# Patient Record
Sex: MALE | Race: OTHER | Employment: OTHER | URBAN - METROPOLITAN AREA
[De-identification: names, ages, dates, MRNs, and addresses within clinical notes are randomized per-mention and may not be internally consistent; named-entity substitution may affect disease eponyms.]

---

## 2020-12-01 ENCOUNTER — APPOINTMENT (OUTPATIENT)
Dept: CT IMAGING | Age: 20
DRG: 871 | End: 2020-12-01
Attending: EMERGENCY MEDICINE

## 2020-12-01 ENCOUNTER — HOSPITAL ENCOUNTER (INPATIENT)
Age: 20
LOS: 4 days | Discharge: HOME | DRG: 871 | End: 2020-12-07
Attending: PEDIATRICS | Admitting: FAMILY MEDICINE

## 2020-12-01 DIAGNOSIS — Z20.822 SUSPECTED COVID-19 VIRUS INFECTION: Primary | ICD-10-CM

## 2020-12-01 DIAGNOSIS — R06.02 SHORTNESS OF BREATH: ICD-10-CM

## 2020-12-01 DIAGNOSIS — R50.9 FEVER, UNSPECIFIED FEVER CAUSE: ICD-10-CM

## 2020-12-01 DIAGNOSIS — J12.9 VIRAL PNEUMONITIS: ICD-10-CM

## 2020-12-01 DIAGNOSIS — R05.9 COUGH: ICD-10-CM

## 2020-12-01 LAB
ANION GAP SERPL CALC-SCNC: 10 MMOL/L (ref 5–15)
BASOPHILS # BLD: 0 K/UL (ref 0–0.1)
BASOPHILS NFR BLD: 0 % (ref 0–1)
BUN SERPL-MCNC: 13 MG/DL (ref 6–20)
BUN/CREAT SERPL: 14 (ref 12–20)
CALCIUM SERPL-MCNC: 8.6 MG/DL (ref 8.5–10.1)
CHLORIDE SERPL-SCNC: 99 MMOL/L (ref 97–108)
CO2 SERPL-SCNC: 22 MMOL/L (ref 21–32)
COMMENT, HOLDF: NORMAL
CREAT SERPL-MCNC: 0.92 MG/DL (ref 0.7–1.3)
DIFFERENTIAL METHOD BLD: ABNORMAL
EOSINOPHIL # BLD: 0 K/UL (ref 0–0.4)
EOSINOPHIL NFR BLD: 0 % (ref 0–7)
ERYTHROCYTE [DISTWIDTH] IN BLOOD BY AUTOMATED COUNT: 12.2 % (ref 11.5–14.5)
GLUCOSE SERPL-MCNC: 103 MG/DL (ref 65–100)
HCT VFR BLD AUTO: 42.5 % (ref 36.6–50.3)
HGB BLD-MCNC: 14.6 G/DL (ref 12.1–17)
IMM GRANULOCYTES # BLD AUTO: 0 K/UL (ref 0–0.04)
IMM GRANULOCYTES NFR BLD AUTO: 0 % (ref 0–0.5)
LACTATE SERPL-SCNC: 1.1 MMOL/L (ref 0.4–2)
LYMPHOCYTES # BLD: 1 K/UL (ref 0.8–3.5)
LYMPHOCYTES NFR BLD: 8 % (ref 12–49)
MCH RBC QN AUTO: 27.9 PG (ref 26–34)
MCHC RBC AUTO-ENTMCNC: 34.4 G/DL (ref 30–36.5)
MCV RBC AUTO: 81.1 FL (ref 80–99)
MONOCYTES # BLD: 1 K/UL (ref 0–1)
MONOCYTES NFR BLD: 9 % (ref 5–13)
NEUTS SEG # BLD: 9.3 K/UL (ref 1.8–8)
NEUTS SEG NFR BLD: 83 % (ref 32–75)
NRBC # BLD: 0 K/UL (ref 0–0.01)
NRBC BLD-RTO: 0 PER 100 WBC
PLATELET # BLD AUTO: 269 K/UL (ref 150–400)
PMV BLD AUTO: 10 FL (ref 8.9–12.9)
POTASSIUM SERPL-SCNC: 3.9 MMOL/L (ref 3.5–5.1)
RBC # BLD AUTO: 5.24 M/UL (ref 4.1–5.7)
SAMPLES BEING HELD,HOLD: NORMAL
SODIUM SERPL-SCNC: 131 MMOL/L (ref 136–145)
WBC # BLD AUTO: 11.4 K/UL (ref 4.1–11.1)

## 2020-12-01 PROCEDURE — 87635 SARS-COV-2 COVID-19 AMP PRB: CPT

## 2020-12-01 PROCEDURE — 82550 ASSAY OF CK (CPK): CPT

## 2020-12-01 PROCEDURE — 85025 COMPLETE CBC W/AUTO DIFF WBC: CPT

## 2020-12-01 PROCEDURE — 82728 ASSAY OF FERRITIN: CPT

## 2020-12-01 PROCEDURE — 71275 CT ANGIOGRAPHY CHEST: CPT

## 2020-12-01 PROCEDURE — 36415 COLL VENOUS BLD VENIPUNCTURE: CPT

## 2020-12-01 PROCEDURE — 80048 BASIC METABOLIC PNL TOTAL CA: CPT

## 2020-12-01 PROCEDURE — 74011250636 HC RX REV CODE- 250/636: Performed by: PEDIATRICS

## 2020-12-01 PROCEDURE — 86140 C-REACTIVE PROTEIN: CPT

## 2020-12-01 PROCEDURE — 83605 ASSAY OF LACTIC ACID: CPT

## 2020-12-01 PROCEDURE — 74011250637 HC RX REV CODE- 250/637: Performed by: EMERGENCY MEDICINE

## 2020-12-01 PROCEDURE — 99285 EMERGENCY DEPT VISIT HI MDM: CPT

## 2020-12-01 PROCEDURE — 74011000636 HC RX REV CODE- 636: Performed by: RADIOLOGY

## 2020-12-01 PROCEDURE — 74011000258 HC RX REV CODE- 258: Performed by: RADIOLOGY

## 2020-12-01 PROCEDURE — 83615 LACTATE (LD) (LDH) ENZYME: CPT

## 2020-12-01 PROCEDURE — 96361 HYDRATE IV INFUSION ADD-ON: CPT

## 2020-12-01 PROCEDURE — 74011250636 HC RX REV CODE- 250/636: Performed by: EMERGENCY MEDICINE

## 2020-12-01 PROCEDURE — 96374 THER/PROPH/DIAG INJ IV PUSH: CPT

## 2020-12-01 PROCEDURE — 84145 PROCALCITONIN (PCT): CPT

## 2020-12-01 RX ORDER — ACETAMINOPHEN 500 MG
1000 TABLET ORAL
Status: COMPLETED | OUTPATIENT
Start: 2020-12-01 | End: 2020-12-01

## 2020-12-01 RX ORDER — SODIUM CHLORIDE 0.9 % (FLUSH) 0.9 %
10 SYRINGE (ML) INJECTION
Status: COMPLETED | OUTPATIENT
Start: 2020-12-01 | End: 2020-12-01

## 2020-12-01 RX ORDER — SODIUM CHLORIDE 9 MG/ML
70 INJECTION, SOLUTION INTRAVENOUS
Status: COMPLETED | OUTPATIENT
Start: 2020-12-02 | End: 2020-12-01

## 2020-12-01 RX ORDER — IBUPROFEN 400 MG/1
800 TABLET ORAL
Status: COMPLETED | OUTPATIENT
Start: 2020-12-01 | End: 2020-12-01

## 2020-12-01 RX ORDER — DEXAMETHASONE SODIUM PHOSPHATE 10 MG/ML
10 INJECTION INTRAMUSCULAR; INTRAVENOUS ONCE
Status: COMPLETED | OUTPATIENT
Start: 2020-12-02 | End: 2020-12-01

## 2020-12-01 RX ADMIN — Medication 10 ML: at 22:00

## 2020-12-01 RX ADMIN — SODIUM CHLORIDE 100 ML: 900 INJECTION, SOLUTION INTRAVENOUS at 22:00

## 2020-12-01 RX ADMIN — ACETAMINOPHEN 1000 MG: 500 TABLET ORAL at 20:43

## 2020-12-01 RX ADMIN — DEXAMETHASONE SODIUM PHOSPHATE 10 MG: 10 INJECTION, SOLUTION INTRAMUSCULAR; INTRAVENOUS at 23:40

## 2020-12-01 RX ADMIN — IBUPROFEN 800 MG: 400 TABLET, FILM COATED ORAL at 20:43

## 2020-12-01 RX ADMIN — SODIUM CHLORIDE 1000 ML: 900 INJECTION, SOLUTION INTRAVENOUS at 20:56

## 2020-12-01 RX ADMIN — SODIUM CHLORIDE 70 ML/HR: 900 INJECTION, SOLUTION INTRAVENOUS at 23:40

## 2020-12-01 RX ADMIN — IOPAMIDOL 80 ML: 755 INJECTION, SOLUTION INTRAVENOUS at 21:00

## 2020-12-02 PROBLEM — J12.82 PNEUMONIA DUE TO COVID-19 VIRUS: Status: ACTIVE | Noted: 2020-12-01

## 2020-12-02 PROBLEM — U07.1 PNEUMONIA DUE TO COVID-19 VIRUS: Status: ACTIVE | Noted: 2020-12-01

## 2020-12-02 PROBLEM — J12.9 VIRAL PNEUMONIA: Status: ACTIVE | Noted: 2020-12-02

## 2020-12-02 LAB
ALBUMIN SERPL-MCNC: 3 G/DL (ref 3.5–5)
ALBUMIN/GLOB SERPL: 0.7 {RATIO} (ref 1.1–2.2)
ALP SERPL-CCNC: 74 U/L (ref 45–117)
ALT SERPL-CCNC: 42 U/L (ref 12–78)
ANION GAP SERPL CALC-SCNC: 11 MMOL/L (ref 5–15)
AST SERPL-CCNC: 29 U/L (ref 15–37)
BILIRUB DIRECT SERPL-MCNC: 0.3 MG/DL (ref 0–0.2)
BILIRUB SERPL-MCNC: 1.2 MG/DL (ref 0.2–1)
BUN SERPL-MCNC: 14 MG/DL (ref 6–20)
BUN/CREAT SERPL: 14 (ref 12–20)
CALCIUM SERPL-MCNC: 9 MG/DL (ref 8.5–10.1)
CHLORIDE SERPL-SCNC: 106 MMOL/L (ref 97–108)
CK SERPL-CCNC: 145 U/L (ref 39–308)
CO2 SERPL-SCNC: 21 MMOL/L (ref 21–32)
COVID-19 RAPID TEST, COVR: DETECTED
CREAT SERPL-MCNC: 1.03 MG/DL (ref 0.7–1.3)
CRP SERPL-MCNC: 10.7 MG/DL (ref 0–0.6)
D DIMER PPP FEU-MCNC: 0.72 MG/L FEU (ref 0–0.65)
FERRITIN SERPL-MCNC: 630 NG/ML (ref 26–388)
GLOBULIN SER CALC-MCNC: 4.4 G/DL (ref 2–4)
GLUCOSE SERPL-MCNC: 86 MG/DL (ref 65–100)
LDH SERPL L TO P-CCNC: 331 U/L (ref 85–241)
POTASSIUM SERPL-SCNC: 4.2 MMOL/L (ref 3.5–5.1)
PROCALCITONIN SERPL-MCNC: 0.12 NG/ML
PROT SERPL-MCNC: 7.4 G/DL (ref 6.4–8.2)
SODIUM SERPL-SCNC: 138 MMOL/L (ref 136–145)
SOURCE, COVRS: ABNORMAL
SPECIMEN SOURCE, FCOV2M: ABNORMAL
SPECIMEN TYPE, XMCV1T: ABNORMAL
TROPONIN I SERPL-MCNC: <0.05 NG/ML

## 2020-12-02 PROCEDURE — 99218 HC RM OBSERVATION: CPT

## 2020-12-02 PROCEDURE — 74011000258 HC RX REV CODE- 258: Performed by: INTERNAL MEDICINE

## 2020-12-02 PROCEDURE — 74011250636 HC RX REV CODE- 250/636: Performed by: HOSPITALIST

## 2020-12-02 PROCEDURE — 80048 BASIC METABOLIC PNL TOTAL CA: CPT

## 2020-12-02 PROCEDURE — XW033E5 INTRODUCTION OF REMDESIVIR ANTI-INFECTIVE INTO PERIPHERAL VEIN, PERCUTANEOUS APPROACH, NEW TECHNOLOGY GROUP 5: ICD-10-PCS | Performed by: INTERNAL MEDICINE

## 2020-12-02 PROCEDURE — 80076 HEPATIC FUNCTION PANEL: CPT

## 2020-12-02 PROCEDURE — 74011000250 HC RX REV CODE- 250: Performed by: INTERNAL MEDICINE

## 2020-12-02 PROCEDURE — 96372 THER/PROPH/DIAG INJ SC/IM: CPT

## 2020-12-02 PROCEDURE — 96375 TX/PRO/DX INJ NEW DRUG ADDON: CPT

## 2020-12-02 PROCEDURE — 74011250637 HC RX REV CODE- 250/637: Performed by: FAMILY MEDICINE

## 2020-12-02 PROCEDURE — 84484 ASSAY OF TROPONIN QUANT: CPT

## 2020-12-02 PROCEDURE — 85379 FIBRIN DEGRADATION QUANT: CPT

## 2020-12-02 PROCEDURE — 36415 COLL VENOUS BLD VENIPUNCTURE: CPT

## 2020-12-02 RX ORDER — ZINC SULFATE 50(220)MG
1 CAPSULE ORAL DAILY
Status: DISCONTINUED | OUTPATIENT
Start: 2020-12-02 | End: 2020-12-07 | Stop reason: HOSPADM

## 2020-12-02 RX ORDER — SODIUM CHLORIDE 0.9 % (FLUSH) 0.9 %
5-40 SYRINGE (ML) INJECTION AS NEEDED
Status: DISCONTINUED | OUTPATIENT
Start: 2020-12-02 | End: 2020-12-07 | Stop reason: HOSPADM

## 2020-12-02 RX ORDER — ENOXAPARIN SODIUM 100 MG/ML
40 INJECTION SUBCUTANEOUS DAILY
Status: DISCONTINUED | OUTPATIENT
Start: 2020-12-02 | End: 2020-12-02

## 2020-12-02 RX ORDER — ACETAMINOPHEN 650 MG/1
650 SUPPOSITORY RECTAL
Status: DISCONTINUED | OUTPATIENT
Start: 2020-12-02 | End: 2020-12-07 | Stop reason: HOSPADM

## 2020-12-02 RX ORDER — SODIUM CHLORIDE 0.9 % (FLUSH) 0.9 %
5-40 SYRINGE (ML) INJECTION EVERY 8 HOURS
Status: DISCONTINUED | OUTPATIENT
Start: 2020-12-02 | End: 2020-12-07 | Stop reason: HOSPADM

## 2020-12-02 RX ORDER — ASCORBIC ACID 500 MG
500 TABLET ORAL DAILY
Status: DISCONTINUED | OUTPATIENT
Start: 2020-12-02 | End: 2020-12-07 | Stop reason: HOSPADM

## 2020-12-02 RX ORDER — ENOXAPARIN SODIUM 100 MG/ML
40 INJECTION SUBCUTANEOUS EVERY 12 HOURS
Status: DISCONTINUED | OUTPATIENT
Start: 2020-12-02 | End: 2020-12-07 | Stop reason: HOSPADM

## 2020-12-02 RX ORDER — DEXAMETHASONE 4 MG/1
6 TABLET ORAL DAILY
Status: DISCONTINUED | OUTPATIENT
Start: 2020-12-02 | End: 2020-12-07 | Stop reason: HOSPADM

## 2020-12-02 RX ORDER — ACETAMINOPHEN 325 MG/1
650 TABLET ORAL
Status: DISCONTINUED | OUTPATIENT
Start: 2020-12-02 | End: 2020-12-07 | Stop reason: HOSPADM

## 2020-12-02 RX ADMIN — ZINC SULFATE 220 MG (50 MG) CAPSULE 1 CAPSULE: CAPSULE at 08:49

## 2020-12-02 RX ADMIN — ACETAMINOPHEN 650 MG: 325 TABLET ORAL at 16:47

## 2020-12-02 RX ADMIN — REMDESIVIR 200 MG: 100 INJECTION, POWDER, LYOPHILIZED, FOR SOLUTION INTRAVENOUS at 19:54

## 2020-12-02 RX ADMIN — ENOXAPARIN SODIUM 40 MG: 40 INJECTION SUBCUTANEOUS at 09:58

## 2020-12-02 RX ADMIN — Medication 10 ML: at 06:25

## 2020-12-02 RX ADMIN — Medication 10 ML: at 21:17

## 2020-12-02 RX ADMIN — OXYCODONE HYDROCHLORIDE AND ACETAMINOPHEN 500 MG: 500 TABLET ORAL at 08:49

## 2020-12-02 RX ADMIN — ENOXAPARIN SODIUM 40 MG: 40 INJECTION SUBCUTANEOUS at 21:17

## 2020-12-02 RX ADMIN — DEXAMETHASONE 6 MG: 4 TABLET ORAL at 08:49

## 2020-12-02 NOTE — H&P
[facility] [facility]  History and Physical    Primary Care Provider: None  Date of Service:  12/1/2020    Subjective:     Qiana Strange is a previously healthy [address] [facility] y.o. male who presents with dyspnea, dry cough, subjective fever, fatigue, and myalgia for 1 week, and is being admitted for viral pneumonia. He took Tylenol, and aspirin for his symptoms with no improvement. He denies chest pain. He endorses nausea, and decreased appetite. Patient works disinfecting factories. He lives with his sister, and another friend, and denies sick contacts, or recent travel. In the ED, he was febrile to 103.1, tachycardic to 128, and tachypneic to 39. Spo2 was 93-95% on room air. He had a leukocytosis to 11.4 with left shift, hyponatremia to 131, and normal lactate of 1.1. CTA showed bilateral pulmonary infiltrates, and no evidence of PE. He received tylenol, motrin, dexamethosone, and 1L ns bolus    Review of Systems:    A comprehensive review of systems was negative except for that written in the History of Present Illness. History reviewed. No pertinent past medical history. Past Surgical History:   Procedure Laterality Date    HX APPENDECTOMY      HX HEENT      adnoidectomy    HX ORTHOPAEDIC      ankle-ligaments     Prior to Admission medications    Not on File     Allergies   Allergen Reactions    Seafood Anaphylaxis      History reviewed. No pertinent family history. SOCIAL HISTORY:  Patient resides at Home. Patient ambulates without assistance  Smoking history: never  Alcohol history: none        Objective:       Physical Exam:   Visit Vitals  BP (!) 116/93 (BP 1 Location: Right arm, BP Patient Position: At rest)   Pulse 98   Temp 98.3 °F (36.8 °C)   Resp 24   Wt 100.7 kg (222 lb 0.1 oz)   SpO2 99%     General:  Alert, cooperative, no distress, appears stated age. Head:  Normocephalic, without obvious abnormality, atraumatic.    Eyes:  Conjunctivae/corneas clear.  EOMs intact. Throat: Lips, mucosa, and tongue normal. Teeth and gums normal.   Neck: Supple, symmetrical, trachea midline       Lungs:   Clear to auscultation bilaterally. spo2 95% RA, RR 12 at rest   Chest wall:  No tenderness or deformity. Heart:  Regular rate and rhythm, S1, S2 normal, no murmur, click, rub or gallop. Abdomen:   Soft, non-tender. Bowel sounds normal. No masses,  No organomegaly. Extremities: Extremities normal, atraumatic, no cyanosis or edema. Pulses: 2+ radial pulses   Skin: Skin color, texture, turgor normal. No rashes or lesions       Recent Results (from the past 24 hour(s))   SAMPLES BEING HELD    Collection Time: 12/01/20  8:57 PM   Result Value Ref Range    SAMPLES BEING HELD 1 RED, 1 JARRED, 2 BC(LAV), 2 BC(blue)     COMMENT        Add-on orders for these samples will be processed based on acceptable specimen integrity and analyte stability, which may vary by analyte. CBC WITH AUTOMATED DIFF    Collection Time: 12/01/20  8:57 PM   Result Value Ref Range    WBC 11.4 (H) 4.1 - 11.1 K/uL    RBC 5.24 4.10 - 5.70 M/uL    HGB 14.6 12.1 - 17.0 g/dL    HCT 42.5 36.6 - 50.3 %    MCV 81.1 80.0 - 99.0 FL    MCH 27.9 26.0 - 34.0 PG    MCHC 34.4 30.0 - 36.5 g/dL    RDW 12.2 11.5 - 14.5 %    PLATELET 070 882 - 788 K/uL    MPV 10.0 8.9 - 12.9 FL    NRBC 0.0 0  WBC    ABSOLUTE NRBC 0.00 0.00 - 0.01 K/uL    NEUTROPHILS 83 (H) 32 - 75 %    LYMPHOCYTES 8 (L) 12 - 49 %    MONOCYTES 9 5 - 13 %    EOSINOPHILS 0 0 - 7 %    BASOPHILS 0 0 - 1 %    IMMATURE GRANULOCYTES 0 0.0 - 0.5 %    ABS. NEUTROPHILS 9.3 (H) 1.8 - 8.0 K/UL    ABS. LYMPHOCYTES 1.0 0.8 - 3.5 K/UL    ABS. MONOCYTES 1.0 0.0 - 1.0 K/UL    ABS. EOSINOPHILS 0.0 0.0 - 0.4 K/UL    ABS. BASOPHILS 0.0 0.0 - 0.1 K/UL    ABS. IMM.  GRANS. 0.0 0.00 - 0.04 K/UL    DF AUTOMATED     METABOLIC PANEL, BASIC    Collection Time: 12/01/20  8:57 PM   Result Value Ref Range    Sodium 131 (L) 136 - 145 mmol/L    Potassium 3.9 3.5 - 5.1 mmol/L    Chloride 99 97 - 108 mmol/L    CO2 22 21 - 32 mmol/L    Anion gap 10 5 - 15 mmol/L    Glucose 103 (H) 65 - 100 mg/dL    BUN 13 6 - 20 MG/DL    Creatinine 0.92 0.70 - 1.30 MG/DL    BUN/Creatinine ratio 14 12 - 20      GFR est AA >60 >60 ml/min/1.73m2    GFR est non-AA >60 >60 ml/min/1.73m2    Calcium 8.6 8.5 - 10.1 MG/DL   LACTIC ACID    Collection Time: 12/01/20  8:57 PM   Result Value Ref Range    Lactic acid 1.1 0.4 - 2.0 MMOL/L   SARS-COV-2    Collection Time: 12/01/20 11:45 PM   Result Value Ref Range    Specimen source Nasopharyngeal      Specimen source Nasopharyngeal      COVID-19 rapid test Detected (AA) NOTD      Specimen type NP Swab       Cta Chest W Or W Wo Cont    Result Date: 12/1/2020  IMPRESSION: Fairly significant bilateral pulmonary infiltrates. Consider atypical viral pneumonia. No evidence of pulmonary embolism. Data Review: All diagnostic labs and studies have been reviewed.     Assessment:     Active Problems:    Suspected COVID-19 virus infection (12/1/2020)        Plan:     #COVID pneumonia  -Rapid Covid positive  -supplemental O2 PRN, currently 93-95% on RA  -CK, CRP, d-dimer, ferritin, LDH, procalcitonin, troponin  -zinc + vitamin C  -lovenox for dvt ppx    #Hyponatremia  -Na 131  -IVF  -daily BMP    dvt ppx: lovenox  Code: full  MPOA: Marta Hidalgo (sister)>[phone]      FUNCTIONAL STATUS PRIOR TO HOSPITALIZATION Ambulates Independently     Signed By: Hayley Boston MD     December 1, 2020

## 2020-12-02 NOTE — ED NOTES
As patient is requiring oxygen for care of his COVID-19 pneumonitis current recommendations are to treat with dexamethasone as it reduces mortality. We are treating with 10 mg of IV dexamethasone.

## 2020-12-02 NOTE — ED NOTES
TRANSFER - OUT REPORT:    Verbal report given to Srinivas Hackett on [employer]  being transferred to 205 for routine progression of care       Report consisted of patients Situation, Background, Assessment and   Recommendations(SBAR). Information from the following report(s) SBAR, ED Summary, Intake/Output, MAR and Recent Results was reviewed with the receiving nurse. Lines:   Peripheral IV 12/01/20 Left Antecubital (Active)   Site Assessment Clean, dry, & intact 12/01/20 2055   Phlebitis Assessment 0 12/01/20 2055   Infiltration Assessment 0 12/01/20 2055   Dressing Status Clean, dry, & intact 12/01/20 2055       Peripheral IV 12/01/20 Right Hand (Active)   Site Assessment Clean, dry, & intact 12/01/20 2110   Phlebitis Assessment 0 12/01/20 2110   Infiltration Assessment 0 12/01/20 2110   Dressing Status Clean, dry, & intact 12/01/20 2110        Opportunity for questions and clarification was provided.       Patient transported with:   Powered

## 2020-12-02 NOTE — ED NOTES
Reassessment complete: Coarse breath sounds noted throughout. Respirations even and unlabored. Pt. Remains on 2 L of oxygen via nasal cannula. Pt. Denies SOB at this time. Pt. Denies any c/o pain. Updated patient on plan of care, admission to 205. Pt. Verbalized understanding.

## 2020-12-02 NOTE — PROGRESS NOTES
[address] [facility]  [facility]ospitalist [facility]                                                                                          Hospitalist Progress Note  Marina Prado MD  Answering service: [phone] from in house phone        Date of Service:  2020  NAME:  Carlos Alberto Mayberry  :  2000  MRN:  [id number]      Admission Summary:   23 y.o M admitted due to COVID 19 pneumonia    Interval history / Subjective:   Patient seen and examined     Spoke to patient with help of     States that he has dyspnea on exertion, cough. Assessment & Plan:     # COVID 19 pneumonia  - O2 sat 91-92% on room air  -continue decadron,vitamin C and zinc.  -ID consulted for remdesivir  -discussed with patient about remdesivir.  -monitor oxygen requirements  -trend inflammatory markers  Can consider convalescent plasma based on clinical course    #Mild hyponatremia:  -recheck sodium. Code status: full  DVT prophylaxis: lovenox    Care Plan discussed with:patient,nurse  Anticipated Disposition: home  Anticipated  [facility] Problems  Never Reviewed          Codes Class Noted POA    Viral pneumonia ICD-10-CM: J12.9  ICD-9-CM: 480.9  2020 Unknown        Pneumonia due to COVID-19 virus ICD-10-CM: U07.1, J12.89  ICD-9-CM: 480.8  2020 Yes                Review of Systems:   As per HPI      Vital Signs:    Last 24hrs VS reviewed since prior progress note.  Most recent are:  Visit Vitals  BP (!) 143/87 (BP 1 Location: Right arm, BP Patient Position: At rest)   Pulse (!) 106   Temp 99.3 °F (37.4 °C)   Resp 10   Ht 6' (1.829 m)   Wt 100.7 kg (222 lb)   SpO2 92%   BMI 30.11 kg/m²         Intake/Output Summary (Last 24 hours) at 2020 1755  Last data filed at 2020 9904  Gross per 24 hour   Intake 120 ml   Output --   Net 120 ml        Physical Examination:     I had a face to face encounter with this patient and independently examined them on 2020 as outlined below:          Constitutional:  No acute distress       Resp:  no wheezing,no accessory muscle use,no conversational tachypnea   CV:  Regular rhythm, normal rate    GI:  Soft, non distended, non tender     Musculoskeletal:  No LE edema    Neurologic:  Moves all extremities. AAOx3     Psych:  Not anxious nor agitated. Skin:   no rashes or ulcers       Data Review:    Review and/or order of clinical lab test  Review and/or order of tests in the radiology section of CPT  Review and/or order of tests in the medicine section of CPT      Labs:     Recent Labs     12/01/20 2057   WBC 11.4*   HGB 14.6   HCT 42.5        Recent Labs     12/01/20 2057   *   K 3.9   CL 99   CO2 22   BUN 13   CREA 0.92   *   CA 8.6     Recent Labs     12/02/20 0206   ALT 42   AP 74   TBILI 1.2*   TP 7.4   ALB 3.0*   GLOB 4.4*     No results for input(s): INR, PTP, APTT, INREXT in the last 72 hours. Recent Labs     12/01/20 2057   FERR 630*      No results found for: FOL, RBCF   No results for input(s): PH, PCO2, PO2 in the last 72 hours.   Recent Labs     12/02/20 0206 12/01/20 2057   CPK  --  145   TROIQ <0.05  --      No results found for: CHOL, CHOLX, CHLST, CHOLV, HDL, HDLP, LDL, LDLC, DLDLP, TGLX, TRIGL, TRIGP, CHHD, CHHDX  No results found for: GLUCPOC  No results found for: COLOR, APPRN, SPGRU, REFSG, ALEX, PROTU, GLUCU, KETU, BILU, UROU, ABDOUL, LEUKU, GLUKE, EPSU, BACTU, WBCU, RBCU, CASTS, UCRY      Medications Reviewed:     Current Facility-Administered Medications   Medication Dose Route Frequency    sodium chloride (NS) flush 5-40 mL  5-40 mL IntraVENous Q8H    sodium chloride (NS) flush 5-40 mL  5-40 mL IntraVENous PRN    acetaminophen (TYLENOL) tablet 650 mg  650 mg Oral Q6H PRN    Or    acetaminophen (TYLENOL) suppository 650 mg  650 mg Rectal Q6H PRN    zinc sulfate (ZINCATE) 220 (50) mg capsule 1 Cap  1 Cap Oral DAILY    ascorbic acid (vitamin C) (VITAMIN C) tablet 500 mg  500 mg Oral DAILY    dexAMETHasone (DECADRON) tablet 6 mg  6 mg Oral DAILY    enoxaparin (LOVENOX) injection 40 mg  40 mg SubCUTAneous Q12H     ______________________________________________________________________  EXPECTED LENGTH OF STAY: - - -  ACTUAL LENGTH OF STAY:          0                 Sahra Castillo MD

## 2020-12-02 NOTE — ED TRIAGE NOTES
Triage Note: Pt. Had a fever last week started on Monday x 3 days. Pt. States pt. Started with a cough on Saturday. Pt. Was seen at [facility] yesterday- COVID test done, results unknown. Pt. C/o shortenss of breath and difficulty breathing that started yesterday. Pt. States fever restarted yesterday. Pt. Drinking approx. 5-6 bottles of water per day. Pt. States pt. Voided 2-3 x's today. No Meds PTA. Triage information obtained using the I AM AT video  #[id number].

## 2020-12-02 NOTE — CONSULTS
Remdesivir Initiation Note    This patient meets criteria for initiation of remdesivir based on the following:  · Proven COVID-19  · Moderate disease (Requiring supplemental O2)  · Acceptable hepatic function (ALT within 5 times ULN)       Liver function tests will be monitored daily while on remdesivir. I discussed with Mr. Scot Crigler. His saturation on room air is less than 94%. I told him that there is no mortality benefit but it could reduce the duration of symptoms by 4 days. I discussed with him the possible side effects which include liver toxicity, headache and dizziness. He has agreed to the drug and I have already ordered it.

## 2020-12-02 NOTE — ED NOTES
Pt. Receiving IV fluids at this time. Pt. Remains on cardiac monitor and continuous pulse ox. Sats-95 %. RR-36. Will continue to monitor.

## 2020-12-02 NOTE — ED NOTES
Reassessment complete: Coarse breath sounds noted throughout. RR-24. Pt. Coughing while attempting to take a deep breath. Pt. C/o pain while taking a deep breath. Pt. Remains on continuous pulse ox and cardiac monitor. Pt. Voided 500 ml of clear yellow urine. Will continue to monitor.

## 2020-12-02 NOTE — PROGRESS NOTES
Patient titrated off of oxygen this morning, placed on O2 monitor. Patient reports dyspnea with exertion. Appetite is moderate. Problem: Risk for Spread of Infection  Goal: Prevent transmission of infectious organism to others  Description: Prevent the transmission of infectious organisms to other patients, staff members, and visitors. Outcome: Progressing Towards Goal     Problem: Patient Education:  Go to Education Activity  Goal: Patient/Family Education  Outcome: Progressing Towards Goal     Problem: Airway Clearance - Ineffective  Goal: Achieve or maintain patent airway  Outcome: Progressing Towards Goal     Problem: Gas Exchange - Impaired  Goal: Absence of hypoxia  Outcome: Progressing Towards Goal  Goal: Promote optimal lung function  Outcome: Progressing Towards Goal     Problem: Breathing Pattern - Ineffective  Goal: Ability to achieve and maintain a regular respiratory rate  Outcome: Progressing Towards Goal     Problem:  Body Temperature -  Risk of, Imbalanced  Goal: Ability to maintain a body temperature within defined limits  Outcome: Progressing Towards Goal  Goal: Will regain or maintain usual level of consciousness  Outcome: Progressing Towards Goal  Goal: Complications related to the disease process, condition or treatment will be avoided or minimized  Outcome: Progressing Towards Goal     Problem: Isolation Precautions - Risk of Spread of Infection  Goal: Prevent transmission of infectious organism to others  Outcome: Progressing Towards Goal     Problem: Nutrition Deficits  Goal: Optimize nutrtional status  Outcome: Progressing Towards Goal     Problem: Risk for Fluid Volume Deficit  Goal: Maintain normal heart rhythm  Outcome: Progressing Towards Goal  Goal: Maintain absence of muscle cramping  Outcome: Progressing Towards Goal  Goal: Maintain normal serum potassium, sodium, calcium, phosphorus, and pH  Outcome: Progressing Towards Goal     Problem: Loneliness or Risk for Loneliness  Goal: Demonstrate positive use of time alone when socialization is not possible  Outcome: Progressing Towards Goal     Problem: Fatigue  Goal: Verbalize increase energy and improved vitality  Outcome: Progressing Towards Goal     Problem: Patient Education: Go to Patient Education Activity  Goal: Patient/Family Education  Outcome: Progressing Towards Goal

## 2020-12-02 NOTE — PROGRESS NOTES
TRANSFER - IN REPORT:    Verbal report received from Figueroa Charlton RN(name) on Nanette Vent[facility] 42  being received from ED(unit) for routine progression of care      Report consisted of patients Situation, Background, Assessment and   Recommendations(SBAR). Information from the following report(s) SBAR, Kardex, ED Summary, [facility] and Recent Results was reviewed with the receiving nurse. Opportunity for questions and clarification was provided. Assessment completed upon patients arrival to unit and care assumed.

## 2020-12-02 NOTE — PROGRESS NOTES
Patient placed on continuous pulse ox monitor, oxygen removed. Patient sats 92-93% on room air while laying in bed. Patient reports dyspnea on exertion and strong dry cough.  Patient wore oxygen during the night at 2L for comfort as he felt short of breath

## 2020-12-02 NOTE — PROGRESS NOTES
Bedside shift change report given to [address] (oncoming nurse) by Baker Bumpers (offgoing nurse). Report included the following information SBAR, Kardex, MAR and Recent Results.

## 2020-12-02 NOTE — ED NOTES
10:43 PM  Change of shift. Care of patient taken over from [facility]; H&P reviewed, bedside handoff complete. Awaiting CT and reeval.    Patient is a 23-year-old otherwise healthy male who is been sick for 8 days with cough and congestion and fevers. The fevers improved and then recurred now with worsening shortness of breath over the last 2 days. On examination he has coarse breath sounds with crackles throughout, he is tachypneic from 22-28 for respiratory rate. Oxygen saturations are 94% on room air while resting. Per report oxygen saturations while walking dropped to 92. CT with multifocal viral pneumonitis. This is consistent with a symptomatic patient with Covid pneumonia on day 8 of infection. As he was tachypneic and felt short of breath I started nasal cannula oxygen at 2 L to which she says he feels a little better. 10:47 PM  On reevaluation now several minutes after starting oxygen patient states he does feel better. We will admit to the hospitalist service for further evaluation and care. Perfect Serve Consult for Admission  10:49 PM    ED Room Number: 09/09  Patient Name and age:  Clearnce Side 23 y.o.  male  Working Diagnosis:   1. Suspected COVID-19 virus infection    2. Fever, unspecified fever cause    3. Cough    4. Viral pneumonitis    5. Shortness of breath        COVID-19 Suspicion:  yes  Sepsis present:  no  Reassessment needed: no  Code Status:  Full Code  Readmission: no  Isolation Requirements:  yes  Recommended Level of Care:  med/surg  Department:[facility] ED - [phone]  Other: Probable Covid pneumonia with multifocal viral pneumonitis on CT, dyspnea, and fever. Improved with nasal cannula oxygen.

## 2020-12-02 NOTE — PROGRESS NOTES
Provided  services remotely to Doctor Vincetn Rnagel during an update to patient.               Estefany Blazing [employer]  [phone]

## 2020-12-02 NOTE — ED NOTES
Reassessment complete: Coarse breath sounds noted throughout. Respirations even and unlabored. Pt. States SOB decreased since being placed on 2 L of oxygen. Pt. Remains on cardiac monitor and continuous pulse ox. Pt. Voided 400 ml of clear yellow urine. Will continue to monitor.

## 2020-12-02 NOTE — ED PROVIDER NOTES
Patient is a 17-year-old male with no significant past medical history presenting to emergency department for evaluation of 1 week history of intermittent fever, dry cough, and shortness of breath. Cough and shortness of breath been present for the past 4 days. He reports he was tested for coronavirus yesterday [facility] and is supposed to get his results back tomorrow, would not like another test today in ED. He denies any known sick contacts. He does not have a thermometer at home and does not know how high his temperature has been. He also has had nausea and loose stools. He denies headache, dizziness, chest pain, abdominal pain, vomiting, bloody stool, urinary changes, leg swelling, decreased po intake, decreased urine output, or any other medical complaints at this time. He smokes cigarettes occasionally. Please note that this dictation was completed with [employer], the [employer] voice recognition software.  Quite often unanticipated grammatical, syntax, homophones, and other interpretive errors are inadvertently transcribed by the computer software.  Please disregard these errors.  Please excuse any errors that have escaped final proofreading. Due to language barrier, [employer]  service was used throughout history, physical exam, and subsequent discussion with this patient. History reviewed. No pertinent past medical history. Past Surgical History:   Procedure Laterality Date    HX APPENDECTOMY      HX HEENT      adnoidectomy    HX ORTHOPAEDIC      ankle-ligaments         History reviewed. No pertinent family history.     Social History     Socioeconomic History    Marital status: Not on file     Spouse name: Not on file    Number of children: Not on file    Years of education: Not on file    Highest education level: Not on file   Occupational History    Not on file   Social Needs    Financial resource strain: Not on file    Food insecurity     Worry: Not on file     Inability: Not on file    Transportation needs     Medical: Not on file     Non-medical: Not on file   Tobacco Use    Smoking status: Current Some Day Smoker    Smokeless tobacco: Current User   Substance and Sexual Activity    Alcohol use: Not Currently     Comment:      Drug use: Not on file    Sexual activity: Not on file   Lifestyle    Physical activity     Days per week: Not on file     Minutes per session: Not on file    Stress: Not on file   Relationships    Social connections     Talks on phone: Not on file     Gets together: Not on file     Attends Anabaptist service: Not on file     Active member of club or organization: Not on file     Attends meetings of clubs or organizations: Not on file     Relationship status: Not on file    Intimate partner violence     Fear of current or ex partner: Not on file     Emotionally abused: Not on file     Physically abused: Not on file     Forced sexual activity: Not on file   Other Topics Concern    Not on file   Social History Narrative    Not on file         ALLERGIES: Seafood    Review of Systems   Constitutional: Positive for fever. Negative for chills. HENT: Negative for ear pain and sore throat. Eyes: Negative for visual disturbance. Respiratory: Positive for cough and shortness of breath. Cardiovascular: Negative for chest pain and leg swelling. Gastrointestinal: Positive for nausea. Negative for abdominal pain, blood in stool, diarrhea (loose stools only) and vomiting. Genitourinary: Negative for dysuria, flank pain and hematuria. Musculoskeletal: Negative for back pain. Skin: Negative for color change and rash. Neurological: Negative for dizziness and headaches. Psychiatric/Behavioral: Negative for confusion. Vitals:    12/01/20 2026   SpO2: 94%   Weight: 100.7 kg (222 lb 0.1 oz)            Physical Exam  Vitals signs and nursing note reviewed. Constitutional:       General: He is not in acute distress. Appearance: He is ill-appearing. HENT:      Head: Normocephalic and atraumatic. Mouth/Throat:      Pharynx: Oropharynx is clear. Eyes:      Extraocular Movements: Extraocular movements intact. Conjunctiva/sclera: Conjunctivae normal.   Neck:      Musculoskeletal: Normal range of motion. No neck rigidity. Cardiovascular:      Rate and Rhythm: Regular rhythm. Tachycardia present. Heart sounds: Normal heart sounds. Pulmonary:      Effort: Pulmonary effort is normal. Tachypnea present. No accessory muscle usage, prolonged expiration or respiratory distress. Breath sounds: Normal breath sounds. No stridor. No decreased breath sounds, wheezing, rhonchi or rales. Chest:      Chest wall: No tenderness. Abdominal:      General: Abdomen is flat. There is no distension. Palpations: Abdomen is soft. Tenderness: There is no abdominal tenderness. There is no guarding. Musculoskeletal: Normal range of motion. Right lower leg: No edema. Left lower leg: No edema. Lymphadenopathy:      Cervical: No cervical adenopathy. Skin:     General: Skin is warm and dry. Findings: No rash. Neurological:      General: No focal deficit present. Mental Status: He is alert and oriented to person, place, and time. Psychiatric:         Mood and Affect: Mood normal.          MDM  Number of Diagnoses or Management Options  Cough:   Fever, unspecified fever cause: Suspected COVID-19 virus infection:   Diagnosis management comments: Patient is alert, febrile, tachycardic, tachypneic, oxygen saturation 94% on room air. BP stable. No accessory muscle usage or retractions. Patient is ambulatory in ED without signs of acute distress. One week history of upper respiratory symptoms. Tested for Covid yesterday, has not received results yet. Lungs are clear to auscultation bilaterally.  Patient ambulated with nursing staff, maintained oxygen saturation 92-94% on room air, no clinical changes. Plan: CBC, CMP, lactate, CTA of chest to evaluate for pneumonia versus PE.    9:41 PM  Change of shift. Care of patient signed over to ED attending Dr. Manpreet Patel. Labwork shows leukocytosis, lactate normal, metabolic panel unremarkable. Awaiting CTA of chest and disposition. Amount and/or Complexity of Data Reviewed  Clinical lab tests: reviewed  Tests in the radiology section of CPT®: reviewed  Tests in the medicine section of CPT®: reviewed  Discuss the patient with other providers: yes (ED attending Dr. Manpreet Patel)      ED Course as of Dec 01 2141   Tue Dec 01, 2020   2112 WBC(!): 11.4 [EH]   2139 Temp(!): 103.1 °F (39.5 °C) [EH]   2139 Pulse (Heart Rate)(!): 115 [EH]   0659 METABOLIC PANEL, BASIC(!):    Sodium 131(!)   Potassium 3.9   Chloride 99   CO2 22   Anion gap 10   Glucose 103(!)   BUN 13   Creatinine 0.92   BUN/Creatinine ratio 14   GFR est AA >60   GFR est non-AA >60   Calcium 8.6 [EH]   2141 CBC WITH AUTOMATED DIFF(!):    WBC 11.4(!)   RBC 5.24   HGB 14.6   HCT 42.5   MCV 81.1   MCH 27.9   MCHC 34.4   RDW 12.2   PLATELET 032   MPV 58.6   NRBC 0.0   ABSOLUTE NRBC 0.00   NEUTROPHILS 83(!)   LYMPHOCYTES 8(!)   MONOCYTES 9   EOSINOPHILS 0   BASOPHILS 0   IMMATURE GRANULOCYTES 0   ABS. NEUTROPHILS 9.3(!)   ABS. LYMPHOCYTES 1.0   ABS. MONOCYTES 1.0   ABS. EOSINOPHILS 0.0   ABS. BASOPHILS 0.0   ABS. IMM. Dakota Gin 0.0   DF AUTOMATED [EH]      ED Course User Index  [EH] KATHY Farr    Maksim Ray was evaluated in the Emergency Department on 12/1/2020 for the symptoms described in the history of present illness. He/she was evaluated in the context of the global COVID-19 pandemic, which necessitated consideration that the patient might be at risk for infection with the SARS-CoV-2 virus that causes COVID-19.  Institutional protocols and algorithms that pertain to the evaluation of patients at risk for COVID-19 are in a state of rapid change based on information released by regulatory bodies including the CDC and federal and state organizations. These policies and algorithms were followed during the patient's care in the ED. Surrogate Decision Maker (Who do you want to make decisions for you in the event you are not able to?): No emergency contact information on file.     Ventilation (Do you want to be intubated and mechanically ventilated?):  Yes    CPR (Do you want chest compressions and electricity in an attempt to restart your heart?): Yes

## 2020-12-02 NOTE — DISCHARGE INSTRUCTIONS
Dear Clemente Sorto,    Thank you for choosing [address] [place] [address] for your healthcare needs. We strive to provide EXCELLENT care to you and your family. In an effort to explain clearly why you were here in the hospital, I've also written a very brief summary below. Other details including formal diagnosis, medication changes, and follow up appointment recommendations can also be found in this packet. You were admitted for shortness of breath due to COVID-19 for which you were started steroids, and IV remdesivir. You also received care from specialist physicians in the following specialties:  IP CONSULT TO INFECTIOUS DISEASES    Here are the updates to your medication list:  **Continue decadron for an additional 4 days  ** Continue vitamin C, and vitamin Zn     Remember that it is important for you to take your medications exactly as they are prescribed. It is helpful to keep a list of your medication with the names, dosages, and times to be taken in your wallet. Additionally,     - Please make sure to follow up with your primary care physician within 1-2 weeks of discharge for hospital follow up. - Please make sure to continue to monitor for: worsening chest pain, worsening shortness of breath, and recurrence of high fevers and return to the Emergency Department with any of these symptoms:   - Please get up slowly from a seated or laying position, avoid falls. - Avoid tobacco, alcohol and other illicit drug use. - You should quarantine for an additional 10 days post discharge, and the remaining three days of quarantine have to be completely symptom free. -Advance Care Planning  People with COVID-19 may have no symptoms, mild symptoms, such as fever, cough, and shortness of breath or they may have more severe illness, developing severe and fatal pneumonia.   As a result, Advance Care Planning with attention to naming a health care decision maker (someone you trust to make healthcare decisions for you if you could not speak for yourself) and sharing other health care preferences is important BEFORE a possible health crisis. Please contact your Primary Care Provider to discuss Advance Care Planning. Preventing the Spread of Coronavirus Disease 2019 in Homes and Residential Communities  For the most recent information go to [employer]    Prevention steps for People with confirmed or suspected COVID-19 (including persons under investigation) who do not need to be hospitalized  and   People with confirmed COVID-19 who were hospitalized and determined to be medically stable to go home    Your healthcare provider and public health staff will evaluate whether you can be cared for at home. If it is determined that you do not need to be hospitalized and can be isolated at home, you will be monitored by staff from your local or state health department. You should follow the prevention steps below until a healthcare provider or local or state health department says you can return to your normal activities. Stay home except to get medical care  People who are mildly ill with COVID-19 are able to isolate at home during their illness. You should restrict activities outside your home, except for getting medical care. Do not go to work, school, or public areas. Avoid using public transportation, ride-sharing, or taxis. Separate yourself from other people and animals in your home  People: As much as possible, you should stay in a specific room and away from other people in your home. Also, you should use a separate bathroom, if available. Animals: You should restrict contact with pets and other animals while you are sick with COVID-19, just like you would around other people.  Although there have not been reports of pets or other animals becoming sick with COVID-19, it is still recommended that people sick with COVID-19 limit contact with animals until more information is known about the virus. When possible, have another member of your household care for your animals while you are sick. If you are sick with COVID-19, avoid contact with your pet, including petting, snuggling, being kissed or licked, and sharing food. If you must care for your pet or be around animals while you are sick, wash your hands before and after you interact with pets and wear a facemask. Call ahead before visiting your doctor  If you have a medical appointment, call the healthcare provider and tell them that you have or may have COVID-19. This will help the healthcare providers office take steps to keep other people from getting infected or exposed. Wear a facemask  You should wear a facemask when you are around other people (e.g., sharing a room or vehicle) or pets and before you enter a healthcare providers office. If you are not able to wear a facemask (for example, because it causes trouble breathing), then people who live with you should not stay in the same room with you, or they should wear a facemask if they enter your room. Cover your coughs and sneezes  Cover your mouth and nose with a tissue when you cough or sneeze. Throw used tissues in a lined trash can. Immediately wash your hands with soap and water for at least 20 seconds or, if soap and water are not available, clean your hands with an alcohol-based hand  that contains at least 60% alcohol. Clean your hands often  Wash your hands often with soap and water for at least 20 seconds, especially after blowing your nose, coughing, or sneezing; going to the bathroom; and before eating or preparing food. If soap and water are not readily available, use an alcohol-based hand  with at least 60% alcohol, covering all surfaces of your hands and rubbing them together until they feel dry. Soap and water are the best option if hands are visibly dirty.  Avoid touching your eyes, nose, and mouth with unwashed hands. Avoid sharing personal household items  You should not share dishes, drinking glasses, cups, eating utensils, towels, or bedding with other people or pets in your home. After using these items, they should be washed thoroughly with soap and water. Clean all high-touch surfaces everyday  High touch surfaces include counters, tabletops, doorknobs, bathroom fixtures, toilets, phones, keyboards, tablets, and bedside tables. Also, clean any surfaces that may have blood, stool, or body fluids on them. Use a household cleaning spray or wipe, according to the label instructions. Labels contain instructions for safe and effective use of the cleaning product including precautions you should take when applying the product, such as wearing gloves and making sure you have good ventilation during use of the product. Monitor your symptoms  Seek prompt medical attention if your illness is worsening (e.g., difficulty breathing). Before seeking care, call your healthcare provider and tell them that you have, or are being evaluated for, COVID-19. Put on a facemask before you enter the facility. These steps will help the healthcare providers office to keep other people in the office or waiting room from getting infected or exposed. Ask your healthcare provider to call the local or state health department. Persons who are placed under active monitoring or facilitated self-monitoring should follow instructions provided by their local health department or occupational health professionals, as appropriate. When working with your local health department check their available hours. If you have a medical emergency and need to call 911, notify the dispatch personnel that you have, or are being evaluated for COVID-19. If possible, put on a facemask before emergency medical services arrive.   Discontinuing home isolation  Patients with confirmed COVID-19 should remain under home isolation precautions until the risk of secondary transmission to others is thought to be low. The decision to discontinue home isolation precautions should be made on a case-by-case basis, in consultation with healthcare providers and state and local health departments. Make sure to also see your primary care doctor for follow-up. Bring these papers with you and be sure to review your medication list with your doctor. I cannot stress the importance of follow up enough. I've included the information for your follow-up appointments below: Follow-up Information     Follow up With Specialties Details Why Contact Info    [address] EMR DEPT Pediatric Emergency Medicine Go to If symptoms worsen [facility]  [phone]    Primary care provider                Should you have any fever over 101 degrees for 24 hours, chest pain, shortness of breath, fever, chills, nausea, vomiting, diarrhea, change in mentation, falling, weakness, bleeding, or worsening pain, please seek medical attention immediately. Finally, as your discharging physician, you may be receiving a survey regarding my care. I would greatly value and appreciate your input in the survey as we strive for excellence. If you have any questions, I can be reached at [phone].   Thank you so much again for allowing me to care for you at [facility].    Respectfully yours,  Tasha Coello MD

## 2020-12-03 LAB
ALBUMIN SERPL-MCNC: 2.6 G/DL (ref 3.5–5)
ALBUMIN/GLOB SERPL: 0.6 {RATIO} (ref 1.1–2.2)
ALP SERPL-CCNC: 80 U/L (ref 45–117)
ALT SERPL-CCNC: 40 U/L (ref 12–78)
AST SERPL-CCNC: 26 U/L (ref 15–37)
BILIRUB DIRECT SERPL-MCNC: 0.1 MG/DL (ref 0–0.2)
BILIRUB SERPL-MCNC: 0.5 MG/DL (ref 0.2–1)
GLOBULIN SER CALC-MCNC: 4.2 G/DL (ref 2–4)
PROT SERPL-MCNC: 6.8 G/DL (ref 6.4–8.2)

## 2020-12-03 PROCEDURE — 96372 THER/PROPH/DIAG INJ SC/IM: CPT

## 2020-12-03 PROCEDURE — 77010033678 HC OXYGEN DAILY

## 2020-12-03 PROCEDURE — 65270000029 HC RM PRIVATE

## 2020-12-03 PROCEDURE — 36415 COLL VENOUS BLD VENIPUNCTURE: CPT

## 2020-12-03 PROCEDURE — 74011000250 HC RX REV CODE- 250: Performed by: INTERNAL MEDICINE

## 2020-12-03 PROCEDURE — 80076 HEPATIC FUNCTION PANEL: CPT

## 2020-12-03 PROCEDURE — 99218 HC RM OBSERVATION: CPT

## 2020-12-03 PROCEDURE — 74011250636 HC RX REV CODE- 250/636: Performed by: HOSPITALIST

## 2020-12-03 PROCEDURE — 74011250637 HC RX REV CODE- 250/637: Performed by: INTERNAL MEDICINE

## 2020-12-03 PROCEDURE — 74011000258 HC RX REV CODE- 258: Performed by: INTERNAL MEDICINE

## 2020-12-03 PROCEDURE — 74011250637 HC RX REV CODE- 250/637: Performed by: FAMILY MEDICINE

## 2020-12-03 RX ORDER — GUAIFENESIN/DEXTROMETHORPHAN 100-10MG/5
5 SYRUP ORAL
Status: DISCONTINUED | OUTPATIENT
Start: 2020-12-03 | End: 2020-12-07 | Stop reason: HOSPADM

## 2020-12-03 RX ADMIN — ENOXAPARIN SODIUM 40 MG: 40 INJECTION SUBCUTANEOUS at 09:23

## 2020-12-03 RX ADMIN — ENOXAPARIN SODIUM 40 MG: 40 INJECTION SUBCUTANEOUS at 21:30

## 2020-12-03 RX ADMIN — DEXAMETHASONE 6 MG: 4 TABLET ORAL at 09:23

## 2020-12-03 RX ADMIN — ZINC SULFATE 220 MG (50 MG) CAPSULE 1 CAPSULE: CAPSULE at 09:23

## 2020-12-03 RX ADMIN — REMDESIVIR 100 MG: 5 INJECTION INTRAVENOUS at 20:25

## 2020-12-03 RX ADMIN — OXYCODONE HYDROCHLORIDE AND ACETAMINOPHEN 500 MG: 500 TABLET ORAL at 09:23

## 2020-12-03 RX ADMIN — Medication 10 ML: at 21:36

## 2020-12-03 RX ADMIN — Medication 10 ML: at 14:24

## 2020-12-03 RX ADMIN — ACETAMINOPHEN 650 MG: 325 TABLET ORAL at 20:25

## 2020-12-03 RX ADMIN — GUAIFENESIN AND DEXTROMETHORPHAN 5 ML: 100; 10 SYRUP ORAL at 20:25

## 2020-12-03 NOTE — ACP (ADVANCE CARE PLANNING)
Advance Care Planning     Advance Care Planning Activator (Inpatient)  Conversation Note      Date of ACP Conversation: 12/03/20     Conversation Conducted with:   Víctro Hinds    ACP Activator: Aysha Hernandez RN      Health Care Decision Maker: Víctor Hinds    [facility] Decision Maker:   Primary Decision Maker: Brigida Rodgers - [phone]        Care Preferences    Ventilation: \"If you were in your present state of health and suddenly became very ill and were unable to breathe on your own, what would your preference be about the use of a ventilator (breathing machine) if it were available to you? \"      If patient would desire the use of a ventilator (breathing machine),Yes    \"If your health worsens and it becomes clear that your chance of recovery is unlikely, what would your preference be about the use of a ventilator (breathing machine) if it were available to you? \"     Would the patient desire the use of a ventilator (breathing machine)? Yes      Resuscitation  \"CPR works best to restart the heart when there is a sudden event, like a heart attack, in someone who is otherwise healthy. Unfortunately, CPR does not typically restart the heart for people who have serious health conditions or who are very sick. \"    \"In the event your heart stopped as a result of an underlying serious health condition, would you want attempts to be made to restart your heart (answer \"yes\" for attempt to resuscitate) or would you prefer a natural death (answer \"no\" for do not attempt to resuscitate)? \" Yes      [x] Yes  [] No   Educated Patient / Isis Easton regarding differences between Advance Directives and portable DNR orders.     Length of ACP Conversation in minutes:      Conversation Outcomes:  [] ACP discussion completed  [] Existing advance directive reviewed with patient; no changes to patient's previously recorded wishes     [] New Advance Directive completed   [] Portable Do Not Resuscitate prepared for Provider review and signature  [] POLST/POST/MOLST/MOST prepared for Provider review and signature      Follow-up plan:    [] Schedule follow-up conversation to continue planning  [] Referred individual to Provider for additional questions/concerns   [] Advised patient/agent/surrogate to review completed ACP document and update if needed with changes in condition, patient preferences or care setting     [] This note routed to one or more involved healthcare providers

## 2020-12-03 NOTE — PROGRESS NOTES
[facility] [facility] Adult  Hospitalist Group                                                                                          Hospitalist Progress Note  Vincent Nugent MD  Answering service: [phone] from in house phone        Date of Service:  12/3/2020  NAME:  Yane Ocampo  :  2000  MRN:  [id number]      Admission Summary:   Yane Ocampo is a previously healthy 23 y.o. male who presents with dyspnea, dry cough, subjective fever, fatigue, and myalgia for 1 week, and is being admitted for viral pneumonia. Interval history / Subjective:   Says he overall feels better than when he initially came to the hospital. Still has some SOB, particularly with exertion. Now on 1-2LNC. Assessment & Plan:     COVID 19 pneumonia  - O2 to keep saturation over 90%  -continue decadron,vitamin C and zinc.  -ID consulted for remdesivir, initiated today 12/3  -trend inflammatory markers  -Promote prone positioning as able, incentive spirometry   - Monitor CMP daily while on remdesivir     Mild hyponatremia: resolved. Code status: full  DVT prophylaxis: lovenox    Care Plan discussed with:patient,nurse  Anticipated Disposition: home  Anticipated Discharge-Once stable off O2     Hospital Problems  Never Reviewed          Codes Class Noted POA    Viral pneumonia ICD-10-CM: J12.9  ICD-9-CM: 480.9  2020 Unknown        Pneumonia due to COVID-19 virus ICD-10-CM: U07.1, J12.89  ICD-9-CM: 480.8  2020 Yes                Review of Systems:   As per HPI      Vital Signs:    Last 24hrs VS reviewed since prior progress note.  Most recent are:  Visit Vitals  /71 (BP 1 Location: Right arm, BP Patient Position: At rest)   Pulse 95   Temp 99.2 °F (37.3 °C)   Resp 20   Ht 6' (1.829 m)   Wt 98.7 kg (217 lb 11.2 oz)   SpO2 92%   BMI 29.53 kg/m²       No intake or output data in the 24 hours ending 20 1415     Physical Examination:     I had a face to face encounter with this patient and independently examined them on 12/3/2020 as outlined below:          Constitutional:  No acute distress       Resp:  no wheezing,no accessory muscle use,no conversational tachypnea   CV:  Regular rhythm, normal rate    GI:  Soft, non distended, non tender     Musculoskeletal:  No LE edema    Neurologic:  Moves all extremities. AAOx3     Psych:  Not anxious nor agitated. Skin:   no rashes or ulcers       Data Review:    Review and/or order of clinical lab test  Review and/or order of tests in the radiology section of CPT  Review and/or order of tests in the medicine section of CPT      Labs:     Recent Labs     12/01/20 2057   WBC 11.4*   HGB 14.6   HCT 42.5        Recent Labs     12/02/20 0206 12/01/20 2057    131*   K 4.2 3.9    99   CO2 21 22   BUN 14 13   CREA 1.03 0.92   GLU 86 103*   CA 9.0 8.6     Recent Labs     12/03/20  0245 12/02/20 0206   ALT 40 42   AP 80 74   TBILI 0.5 1.2*   TP 6.8 7.4   ALB 2.6* 3.0*   GLOB 4.2* 4.4*     No results for input(s): INR, PTP, APTT, INREXT, INREXT in the last 72 hours. Recent Labs     12/01/20 2057   FERR 630*      No results found for: FOL, RBCF   No results for input(s): PH, PCO2, PO2 in the last 72 hours.   Recent Labs     12/02/20 0206 12/01/20 2057   CPK  --  145   TROIQ <0.05  --      No results found for: CHOL, CHOLX, CHLST, CHOLV, HDL, HDLP, LDL, LDLC, DLDLP, TGLX, TRIGL, TRIGP, CHHD, CHHDX  No results found for: GLUCPOC  No results found for: COLOR, APPRN, SPGRU, REFSG, ALEX, PROTU, GLUCU, KETU, BILU, UROU, ABDOUL, LEUKU, GLUKE, EPSU, BACTU, WBCU, RBCU, CASTS, UCRY      Medications Reviewed:     Current Facility-Administered Medications   Medication Dose Route Frequency    sodium chloride (NS) flush 5-40 mL  5-40 mL IntraVENous Q8H    sodium chloride (NS) flush 5-40 mL  5-40 mL IntraVENous PRN    acetaminophen (TYLENOL) tablet 650 mg  650 mg Oral Q6H PRN    Or    acetaminophen (TYLENOL) suppository 650 mg  650 mg Rectal Q6H PRN    zinc sulfate (ZINCATE) 220 (50) mg capsule 1 Cap  1 Cap Oral DAILY    ascorbic acid (vitamin C) (VITAMIN C) tablet 500 mg  500 mg Oral DAILY    dexAMETHasone (DECADRON) tablet 6 mg  6 mg Oral DAILY    enoxaparin (LOVENOX) injection 40 mg  40 mg SubCUTAneous Q12H    remdesivir 100 mg in 0.9% sodium chloride 250 mL IVPB  100 mg IntraVENous Q24H     ______________________________________________________________________  EXPECTED LENGTH OF STAY: - - -  ACTUAL LENGTH OF STAY:          0                 Breanna Coppola MD

## 2020-12-03 NOTE — PROGRESS NOTES
RUBINA PLAN;    RUR-8%    Covid positive    Patient will need a follow up appointment with the [facility] at discharge. Patient is unemployed  Family will provide transportation home    Reason for Admission:   Covid positive, fatigue, dyspnea, fever                   RUR Score:  8%                   Plan for utilizing home health:    Not indicated      PCP: First and Last name:  [facility]   Name of Practice:    Are you a current patient: Yes    Approximate date of last visit: 11/30/20   Can you participate in a virtual visit with your PCP: Yes                    Current Advanced Directive/Advance Care Plan: No. However his sister Vinod Aldana will speak on his behalf                         Transition of Care Plan: CM noted patient is on isolation for Covid-19, called and spoke with him on the phone regarding discharge planning. Patient lives with his sister Adamaris Traylor and her friend in their apartment. Patient is independent without any assistive devices. Patient verified that he did not have a PCP or a health insurance and information on the demographics. Patient did not voice any discharge barriers. CM will continue to follow for any discharge needs. Suzi Mcgrath MSA, RN, CRM  Care Management Interventions  PCP Verified by CM: Yes  Palliative Care Criteria Met (RRAT>21 & CHF Dx)?: No  Mode of Transport at Discharge: Other (see comment)(Private car)  Transition of Care Consult (CM Consult): Discharge Planning  MyChart Signup: No  Discharge Durable Medical Equipment: No  Health Maintenance Reviewed: Yes  Physical Therapy Consult: No  Occupational Therapy Consult: No  Speech Therapy Consult: No  Current Support Network:  Other(Lives with his sister and friend)  Confirm Follow Up Transport: Family  Discharge Location  Discharge Placement: Home with family assistance

## 2020-12-03 NOTE — PROGRESS NOTES
Bedside shift change report given to Tim Thomas (oncoming nurse) by Rosie Acharya RN (offgoing nurse). Report included the following information SBAR, MAR and Recent Results.

## 2020-12-03 NOTE — PROGRESS NOTES
Problem: Airway Clearance - Ineffective  Goal: Achieve or maintain patent airway  Outcome: Progressing Towards Goal     Problem: Breathing Pattern - Ineffective  Goal: Ability to achieve and maintain a regular respiratory rate  Outcome: Progressing Towards Goal     Problem:  Body Temperature -  Risk of, Imbalanced  Goal: Ability to maintain a body temperature within defined limits  Outcome: Progressing Towards Goal

## 2020-12-03 NOTE — PROGRESS NOTES
Bedside shift change report given to Katelyn Bass (oncoming nurse) by Calvin Moody RN (offgoing nurse). Report included the following information SBAR, Kardex, MAR and Recent Results.

## 2020-12-04 LAB
ALBUMIN SERPL-MCNC: 2.9 G/DL (ref 3.5–5)
ALBUMIN/GLOB SERPL: 0.6 {RATIO} (ref 1.1–2.2)
ALP SERPL-CCNC: 99 U/L (ref 45–117)
ALT SERPL-CCNC: 66 U/L (ref 12–78)
ANION GAP SERPL CALC-SCNC: 8 MMOL/L (ref 5–15)
AST SERPL-CCNC: 36 U/L (ref 15–37)
BASOPHILS # BLD: 0 K/UL (ref 0–0.1)
BASOPHILS NFR BLD: 0 % (ref 0–1)
BILIRUB DIRECT SERPL-MCNC: 0.2 MG/DL (ref 0–0.2)
BILIRUB SERPL-MCNC: 0.5 MG/DL (ref 0.2–1)
BUN SERPL-MCNC: 17 MG/DL (ref 6–20)
BUN/CREAT SERPL: 19 (ref 12–20)
CALCIUM SERPL-MCNC: 9 MG/DL (ref 8.5–10.1)
CHLORIDE SERPL-SCNC: 106 MMOL/L (ref 97–108)
CO2 SERPL-SCNC: 24 MMOL/L (ref 21–32)
CREAT SERPL-MCNC: 0.91 MG/DL (ref 0.7–1.3)
DIFFERENTIAL METHOD BLD: ABNORMAL
EOSINOPHIL # BLD: 0 K/UL (ref 0–0.4)
EOSINOPHIL NFR BLD: 0 % (ref 0–7)
ERYTHROCYTE [DISTWIDTH] IN BLOOD BY AUTOMATED COUNT: 12.8 % (ref 11.5–14.5)
GLOBULIN SER CALC-MCNC: 4.7 G/DL (ref 2–4)
GLUCOSE SERPL-MCNC: 115 MG/DL (ref 65–100)
HCT VFR BLD AUTO: 43.2 % (ref 36.6–50.3)
HGB BLD-MCNC: 14.8 G/DL (ref 12.1–17)
IMM GRANULOCYTES # BLD AUTO: 0.2 K/UL (ref 0–0.04)
IMM GRANULOCYTES NFR BLD AUTO: 1 % (ref 0–0.5)
LYMPHOCYTES # BLD: 1.4 K/UL (ref 0.8–3.5)
LYMPHOCYTES NFR BLD: 11 % (ref 12–49)
MAGNESIUM SERPL-MCNC: 2.5 MG/DL (ref 1.6–2.4)
MCH RBC QN AUTO: 27.9 PG (ref 26–34)
MCHC RBC AUTO-ENTMCNC: 34.3 G/DL (ref 30–36.5)
MCV RBC AUTO: 81.4 FL (ref 80–99)
MONOCYTES # BLD: 1.2 K/UL (ref 0–1)
MONOCYTES NFR BLD: 9 % (ref 5–13)
NEUTS SEG # BLD: 10.4 K/UL (ref 1.8–8)
NEUTS SEG NFR BLD: 79 % (ref 32–75)
NRBC # BLD: 0 K/UL (ref 0–0.01)
NRBC BLD-RTO: 0 PER 100 WBC
PHOSPHATE SERPL-MCNC: 3.7 MG/DL (ref 2.6–4.7)
PLATELET # BLD AUTO: 396 K/UL (ref 150–400)
PMV BLD AUTO: 9.5 FL (ref 8.9–12.9)
POTASSIUM SERPL-SCNC: 4 MMOL/L (ref 3.5–5.1)
PROT SERPL-MCNC: 7.6 G/DL (ref 6.4–8.2)
RBC # BLD AUTO: 5.31 M/UL (ref 4.1–5.7)
SODIUM SERPL-SCNC: 138 MMOL/L (ref 136–145)
WBC # BLD AUTO: 13.2 K/UL (ref 4.1–11.1)

## 2020-12-04 PROCEDURE — 36415 COLL VENOUS BLD VENIPUNCTURE: CPT

## 2020-12-04 PROCEDURE — 83735 ASSAY OF MAGNESIUM: CPT

## 2020-12-04 PROCEDURE — 65270000029 HC RM PRIVATE

## 2020-12-04 PROCEDURE — 74011250636 HC RX REV CODE- 250/636: Performed by: HOSPITALIST

## 2020-12-04 PROCEDURE — 74011000258 HC RX REV CODE- 258: Performed by: INTERNAL MEDICINE

## 2020-12-04 PROCEDURE — 84100 ASSAY OF PHOSPHORUS: CPT

## 2020-12-04 PROCEDURE — 74011000250 HC RX REV CODE- 250: Performed by: INTERNAL MEDICINE

## 2020-12-04 PROCEDURE — 74011250636 HC RX REV CODE- 250/636: Performed by: INTERNAL MEDICINE

## 2020-12-04 PROCEDURE — 80048 BASIC METABOLIC PNL TOTAL CA: CPT

## 2020-12-04 PROCEDURE — 74011250637 HC RX REV CODE- 250/637: Performed by: FAMILY MEDICINE

## 2020-12-04 PROCEDURE — 85025 COMPLETE CBC W/AUTO DIFF WBC: CPT

## 2020-12-04 PROCEDURE — 80076 HEPATIC FUNCTION PANEL: CPT

## 2020-12-04 RX ORDER — ONDANSETRON 2 MG/ML
4 INJECTION INTRAMUSCULAR; INTRAVENOUS
Status: DISCONTINUED | OUTPATIENT
Start: 2020-12-04 | End: 2020-12-07 | Stop reason: HOSPADM

## 2020-12-04 RX ADMIN — OXYCODONE HYDROCHLORIDE AND ACETAMINOPHEN 500 MG: 500 TABLET ORAL at 09:38

## 2020-12-04 RX ADMIN — ZINC SULFATE 220 MG (50 MG) CAPSULE 1 CAPSULE: CAPSULE at 09:38

## 2020-12-04 RX ADMIN — ONDANSETRON 4 MG: 2 INJECTION INTRAMUSCULAR; INTRAVENOUS at 12:31

## 2020-12-04 RX ADMIN — REMDESIVIR 100 MG: 5 INJECTION INTRAVENOUS at 20:46

## 2020-12-04 RX ADMIN — DEXAMETHASONE 6 MG: 4 TABLET ORAL at 09:38

## 2020-12-04 RX ADMIN — Medication 10 ML: at 15:00

## 2020-12-04 RX ADMIN — Medication 10 ML: at 06:40

## 2020-12-04 RX ADMIN — ENOXAPARIN SODIUM 40 MG: 40 INJECTION SUBCUTANEOUS at 22:40

## 2020-12-04 RX ADMIN — ENOXAPARIN SODIUM 40 MG: 40 INJECTION SUBCUTANEOUS at 09:38

## 2020-12-04 RX ADMIN — ACETAMINOPHEN 650 MG: 325 TABLET ORAL at 16:45

## 2020-12-04 NOTE — PROGRESS NOTES
[address] [facility] Adult  Hospitalist Group                                                                                          Hospitalist Progress Note  John Aguilera MD  Answering service: [phone] from in house phone        Date of Service:  2020  NAME:  Garth Slater  :  2000  MRN:  [id number]      Admission Summary:   Garth Slater is a previously healthy 23 y.o. male who presents with dyspnea, dry cough, subjective fever, fatigue, and myalgia for 1 week, and is being admitted for viral pneumonia. Interval history / Subjective:   Breathing is better, had one episode of vomiting this am, feels better after antiemetic. Assessment & Plan:     Sepsis (Temp, HR > 100, WBC + source) secondary to COVID 19 pneumonia  - O2 to keep saturation over 90%  -continue decadron,vitamin C and zinc.  -ID consulted for remdesivir, initiated 12/3  -trend inflammatory markers  -Promote prone positioning as able, incentive spirometry   - Monitor CMP daily while on remdesivir     N/V - ? COVID vs. Side effect from remdesivir  - Add PRN zofran   - Check KUB if not improved. Mild hyponatremia: resolved. Code status: full  DVT prophylaxis: lovenox    Care Plan discussed with:patient,nurse  Anticipated Disposition: home  Anticipated Discharge-Once stable off O2 and completed remdesivir course. Hospital Problems  Never Reviewed          Codes Class Noted POA    Viral pneumonia ICD-10-CM: J12.9  ICD-9-CM: 480.9  2020 Unknown        Pneumonia due to COVID-19 virus ICD-10-CM: U07.1, J12.89  ICD-9-CM: 480.8  2020 Yes                Review of Systems:   As per HPI      Vital Signs:    Last 24hrs VS reviewed since prior progress note.  Most recent are:  Visit Vitals  /65 (BP 1 Location: Right arm, BP Patient Position: At rest)   Pulse 75   Temp 98 °F (36.7 °C)   Resp 16   Ht 6' (1.829 m)   Wt 97.8 kg (215 lb 9.6 oz)   SpO2 96%   BMI 29.24 kg/m² Intake/Output Summary (Last 24 hours) at 12/4/2020 1546  Last data filed at 12/3/2020 2025  Gross per 24 hour   Intake --   Output 1000 ml   Net -1000 ml        Physical Examination:     I had a face to face encounter with this patient and independently examined them on 12/4/2020 as outlined below:          Constitutional:  No acute distress       Resp:  no wheezing,no accessory muscle use,no conversational tachypnea   CV:  Regular rhythm, normal rate    GI:  Soft, non distended, non tender, normal bowel sounds. Musculoskeletal:  No LE edema    Neurologic:  Moves all extremities. AAOx3     Psych:  Not anxious nor agitated. Skin:   no rashes or ulcers       Data Review:    Review and/or order of clinical lab test  Review and/or order of tests in the radiology section of CPT  Review and/or order of tests in the medicine section of CPT      Labs:     Recent Labs     12/04/20 0108 12/01/20 2057   WBC 13.2* 11.4*   HGB 14.8 14.6   HCT 43.2 42.5    269     Recent Labs     12/04/20 0108 12/02/20 0206 12/01/20 2057    138 131*   K 4.0 4.2 3.9    106 99   CO2 24 21 22   BUN 17 14 13   CREA 0.91 1.03 0.92   * 86 103*   CA 9.0 9.0 8.6   MG 2.5*  --   --    PHOS 3.7  --   --      Recent Labs     12/04/20 0108 12/03/20 0245 12/02/20 0206   ALT 66 40 42   AP 99 80 74   TBILI 0.5 0.5 1.2*   TP 7.6 6.8 7.4   ALB 2.9* 2.6* 3.0*   GLOB 4.7* 4.2* 4.4*     No results for input(s): INR, PTP, APTT, INREXT, INREXT in the last 72 hours. Recent Labs     12/01/20 2057   FERR 630*      No results found for: FOL, RBCF   No results for input(s): PH, PCO2, PO2 in the last 72 hours.   Recent Labs     12/02/20 0206 12/01/20 2057   CPK  --  145   TROIQ <0.05  --      No results found for: CHOL, CHOLX, CHLST, CHOLV, HDL, HDLP, LDL, LDLC, DLDLP, TGLX, TRIGL, TRIGP, CHHD, CHHDX  No results found for: GLUCPOC  No results found for: COLOR, APPRN, SPGRU, REFSG, ALEX, PROTU, GLUCU, KETU, BILU, UROU, ABDOUL, LEUKU, GLUKE, EPSU, BACTU, WBCU, RBCU, CASTS, UCRY      Medications Reviewed:     Current Facility-Administered Medications   Medication Dose Route Frequency    ondansetron (ZOFRAN) injection 4 mg  4 mg IntraVENous Q8H PRN    prochlorperazine (COMPAZINE) with saline injection 5 mg  5 mg IntraVENous Q6H PRN    guaiFENesin-dextromethorphan (ROBITUSSIN DM) 100-10 mg/5 mL syrup 5 mL  5 mL Oral Q6H PRN    sodium chloride (NS) flush 5-40 mL  5-40 mL IntraVENous Q8H    sodium chloride (NS) flush 5-40 mL  5-40 mL IntraVENous PRN    acetaminophen (TYLENOL) tablet 650 mg  650 mg Oral Q6H PRN    Or    acetaminophen (TYLENOL) suppository 650 mg  650 mg Rectal Q6H PRN    zinc sulfate (ZINCATE) 220 (50) mg capsule 1 Cap  1 Cap Oral DAILY    ascorbic acid (vitamin C) (VITAMIN C) tablet 500 mg  500 mg Oral DAILY    dexAMETHasone (DECADRON) tablet 6 mg  6 mg Oral DAILY    enoxaparin (LOVENOX) injection 40 mg  40 mg SubCUTAneous Q12H    remdesivir 100 mg in 0.9% sodium chloride 250 mL IVPB  100 mg IntraVENous Q24H     ______________________________________________________________________  EXPECTED LENGTH OF STAY: 5d 12h  ACTUAL LENGTH OF STAY:          1                 Vincent Nugent MD

## 2020-12-04 NOTE — PROGRESS NOTES
Bedside shift change report given to Helen Recinos (oncoming nurse) by Malia Alcantar RN (offgoing nurse). Report included the following information SBAR, Kardex, MAR and Recent Results.

## 2020-12-04 NOTE — PROGRESS NOTES
Problem: Airway Clearance - Ineffective  Goal: Achieve or maintain patent airway  Outcome: Progressing Towards Goal     Problem: Breathing Pattern - Ineffective  Goal: Ability to achieve and maintain a regular respiratory rate  Outcome: Progressing Towards Goal

## 2020-12-04 NOTE — PROGRESS NOTES
Physician Progress Note      PATIENT:               Kay Rehman  [facility] #:                  [id number]  :                       2000  ADMIT DATE:       2020 8:05 PM  [address] [place] DATE:  RESPONDING  PROVIDER #:        Arcadio Peres MD          QUERY TEXT:    Dear attending physician,  Pt admitted with [phone] and noted to have WBC >12K on ,  temperature of 103.1 on , 100.4 on 12/3 with documentation of pneumonia. If possible, please document in progress notes and discharge summary if you are evaluating and/or treating: The medical record reflects the following:  Risk Factors: +Covid 19  Clinical Indicators:-WBC 11.4, CRP 10.70, 103.1, 100.4 on 12/3, WBC >12K on 12/4  12/3-Per PN- COVID 19 pneumonia  Treatment: ID consult, IV Remdesivir, monitor labwork, imaging, pulse oximetry, vital signs, oxygen as needed to keep saturation over 90%, Tylenol 650 mg po given x 1, Mortrin 800 mg po x 1 on , Tylenol x 1 on  and 12/3, Decadron 10 mg iv x 1 on   [facility] Sepsis Criteria    Sepsis Criteria - A minimum of 2 indicators with infection (indicators should not be explained by another non-infectious condition).  Only one scenario below needs to be present:    Scenario A:  o Temp: fever (core >38.3C or 100.9F) or hypothermia (core temp <36C or 96.8F)        AND    WBC > 12,000 or < 4,000 or bands >10%    Scenario B:  - One of the following: o Temp: fever (core >38.3C or 100.9F) or hypothermia (core temp <36C or 96.8F); OR  o WBC > 12,000 or < 4,000 or bands >10%      AND  - At least one of the following: o Lactate > 2.2 mmol/L  o Tachycardia > 90 beats per minute or more than two SD above the normal value for age  o Tachypnea > 20 breaths per minute a pCO2 of < 32 mm Hg  o Plasma Procalcitonin more than 2 SD above the normal value  o Plasma c-reactive protein elevated more than 2 SD above the normal value  o Altered mental status  o Mottling of the skin or prolonged cap refill  o Non-diabetic, unexplained hyperglycemia (glucose > 140 mg/dL)  o Arterial hypoxemia (Pao2/Fio2 < 300)  o Acute oliguria (urine output <0.5ml/kg/hr. for at least 2 hrs., despite adequate fluid resuscitation)  o Hyperbilirubinemia (> 2 mg/dL)  o Other evidence of acute organ failure such as hypotension (SBP < 90 mmHg or SBP decrease by > 40 mmHg) acute kidney injury supported by KDIGO, thrombocytopenia (< 100,000 /uL), acute respiratory failure, encephalopathy, shock, etc.        Thank you,  Kirstie Pallas RN, BSN  Clinical documentation Improvement  [phone]  Options provided:  -- Sepsis, POA due to COVID-19  -- Sepsis, due to COVID-19  -- COVID-19 without sepsis  -- Other - I will add my own diagnosis  -- Disagree - Not applicable / Not valid  -- Disagree - Clinically unable to determine / Unknown  -- Refer to Clinical Documentation Reviewer    PROVIDER RESPONSE TEXT:    This patient has sepsis, POA due to COVID-19.     Query created by: Mya Saldaña on 12/4/2020 7:33 AM      Electronically signed by:  Jaylene Garcia MD 12/4/2020 9:02 AM

## 2020-12-04 NOTE — PROGRESS NOTES
Bedside shift change report given to [facility] RN (oncoming nurse) by Annalise Story RN (offgoing nurse). Report included the following information SBAR, MAR and Recent Results.

## 2020-12-05 LAB
ALBUMIN SERPL-MCNC: 2.7 G/DL (ref 3.5–5)
ALBUMIN/GLOB SERPL: 0.6 {RATIO} (ref 1.1–2.2)
ALP SERPL-CCNC: 79 U/L (ref 45–117)
ALT SERPL-CCNC: 58 U/L (ref 12–78)
ANION GAP SERPL CALC-SCNC: 10 MMOL/L (ref 5–15)
AST SERPL-CCNC: 25 U/L (ref 15–37)
BASOPHILS # BLD: 0 K/UL (ref 0–0.1)
BASOPHILS NFR BLD: 0 % (ref 0–1)
BILIRUB DIRECT SERPL-MCNC: 0.1 MG/DL (ref 0–0.2)
BILIRUB SERPL-MCNC: 0.4 MG/DL (ref 0.2–1)
BUN SERPL-MCNC: 19 MG/DL (ref 6–20)
BUN/CREAT SERPL: 23 (ref 12–20)
CALCIUM SERPL-MCNC: 9 MG/DL (ref 8.5–10.1)
CHLORIDE SERPL-SCNC: 106 MMOL/L (ref 97–108)
CO2 SERPL-SCNC: 23 MMOL/L (ref 21–32)
CREAT SERPL-MCNC: 0.82 MG/DL (ref 0.7–1.3)
DIFFERENTIAL METHOD BLD: ABNORMAL
EOSINOPHIL # BLD: 0.1 K/UL (ref 0–0.4)
EOSINOPHIL NFR BLD: 1 % (ref 0–7)
ERYTHROCYTE [DISTWIDTH] IN BLOOD BY AUTOMATED COUNT: 12.6 % (ref 11.5–14.5)
GLOBULIN SER CALC-MCNC: 4.2 G/DL (ref 2–4)
GLUCOSE SERPL-MCNC: 123 MG/DL (ref 65–100)
HCT VFR BLD AUTO: 42.7 % (ref 36.6–50.3)
HGB BLD-MCNC: 14.2 G/DL (ref 12.1–17)
IMM GRANULOCYTES # BLD AUTO: 0 K/UL
IMM GRANULOCYTES NFR BLD AUTO: 0 %
LYMPHOCYTES # BLD: 1 K/UL (ref 0.8–3.5)
LYMPHOCYTES NFR BLD: 8 % (ref 12–49)
MAGNESIUM SERPL-MCNC: 2.5 MG/DL (ref 1.6–2.4)
MCH RBC QN AUTO: 27.7 PG (ref 26–34)
MCHC RBC AUTO-ENTMCNC: 33.3 G/DL (ref 30–36.5)
MCV RBC AUTO: 83.4 FL (ref 80–99)
MONOCYTES # BLD: 0.3 K/UL (ref 0–1)
MONOCYTES NFR BLD: 2 % (ref 5–13)
NEUTS SEG # BLD: 11.7 K/UL (ref 1.8–8)
NEUTS SEG NFR BLD: 89 % (ref 32–75)
NRBC # BLD: 0 K/UL (ref 0–0.01)
NRBC BLD-RTO: 0 PER 100 WBC
PHOSPHATE SERPL-MCNC: 4.1 MG/DL (ref 2.6–4.7)
PLATELET # BLD AUTO: 447 K/UL (ref 150–400)
PMV BLD AUTO: 9.6 FL (ref 8.9–12.9)
POTASSIUM SERPL-SCNC: 3.9 MMOL/L (ref 3.5–5.1)
PROT SERPL-MCNC: 6.9 G/DL (ref 6.4–8.2)
RBC # BLD AUTO: 5.12 M/UL (ref 4.1–5.7)
RBC MORPH BLD: ABNORMAL
SODIUM SERPL-SCNC: 139 MMOL/L (ref 136–145)
WBC # BLD AUTO: 13.1 K/UL (ref 4.1–11.1)

## 2020-12-05 PROCEDURE — 84100 ASSAY OF PHOSPHORUS: CPT

## 2020-12-05 PROCEDURE — 74011000250 HC RX REV CODE- 250: Performed by: INTERNAL MEDICINE

## 2020-12-05 PROCEDURE — 80048 BASIC METABOLIC PNL TOTAL CA: CPT

## 2020-12-05 PROCEDURE — 85025 COMPLETE CBC W/AUTO DIFF WBC: CPT

## 2020-12-05 PROCEDURE — 83735 ASSAY OF MAGNESIUM: CPT

## 2020-12-05 PROCEDURE — 36415 COLL VENOUS BLD VENIPUNCTURE: CPT

## 2020-12-05 PROCEDURE — 74011250636 HC RX REV CODE- 250/636: Performed by: HOSPITALIST

## 2020-12-05 PROCEDURE — 80076 HEPATIC FUNCTION PANEL: CPT

## 2020-12-05 PROCEDURE — 74011000258 HC RX REV CODE- 258: Performed by: INTERNAL MEDICINE

## 2020-12-05 PROCEDURE — 65270000029 HC RM PRIVATE

## 2020-12-05 PROCEDURE — 74011250637 HC RX REV CODE- 250/637: Performed by: FAMILY MEDICINE

## 2020-12-05 RX ADMIN — OXYCODONE HYDROCHLORIDE AND ACETAMINOPHEN 500 MG: 500 TABLET ORAL at 09:46

## 2020-12-05 RX ADMIN — Medication 10 ML: at 21:52

## 2020-12-05 RX ADMIN — Medication 10 ML: at 19:32

## 2020-12-05 RX ADMIN — REMDESIVIR 100 MG: 5 INJECTION INTRAVENOUS at 19:32

## 2020-12-05 RX ADMIN — ZINC SULFATE 220 MG (50 MG) CAPSULE 1 CAPSULE: CAPSULE at 09:46

## 2020-12-05 RX ADMIN — ENOXAPARIN SODIUM 40 MG: 40 INJECTION SUBCUTANEOUS at 21:52

## 2020-12-05 RX ADMIN — Medication 10 ML: at 07:24

## 2020-12-05 RX ADMIN — DEXAMETHASONE 6 MG: 4 TABLET ORAL at 09:45

## 2020-12-05 RX ADMIN — ENOXAPARIN SODIUM 40 MG: 40 INJECTION SUBCUTANEOUS at 09:46

## 2020-12-05 NOTE — PROGRESS NOTES
Bedside and Verbal shift change report given to Zoe May (oncoming nurse) by Mary Mendez (offgoing nurse). Report included the following information SBAR, Kardex, MAR and Recent Results.

## 2020-12-05 NOTE — PROGRESS NOTES
[address] [facility] Adult  Hospitalist Group                                                                                          Hospitalist Progress Note  Liyah Zhang MD  Answering service: [phone] from in house phone        Date of Service:  2020  NAME:  Kathie Ochoa  :  2000  MRN:  [id number]      Admission Summary:   Kathie Ochoa is a previously healthy 23 y.o. male who presents with dyspnea, dry cough, subjective fever, fatigue, and myalgia for 1 week, and is being admitted for viral pneumonia. Interval history / Subjective:   Doing better today, no further episodes of vomiting. Desaturated to 86% on RA while ambulating to the bathroom today. Assessment & Plan:     Sepsis (Temp, HR > 100, WBC + source) secondary to COVID 19 pneumonia  - O2 to keep saturation over 90%  -continue decadron,vitamin C and zinc.  -ID consulted for remdesivir, initiated 12/3  -trend inflammatory markers, repeat D dimer tomorrow   -Promote prone positioning as able, incentive spirometry   - Monitor CMP daily while on remdesivir     N/V - ? COVID vs. Side effect from remdesivir, improved. - Continue PRN zofran     Mild hyponatremia: resolved. Code status: full  DVT prophylaxis: lovenox    Care Plan discussed with:patient,nurse  Anticipated Disposition: home  Anticipated Discharge-Once stable off O2 . Hospital Problems  Never Reviewed          Codes Class Noted POA    Viral pneumonia ICD-10-CM: J12.9  ICD-9-CM: 480.9  2020 Unknown        Pneumonia due to COVID-19 virus ICD-10-CM: U07.1, J12.89  ICD-9-CM: 480.8  2020 Yes                Review of Systems:   As per HPI      Vital Signs:    Last 24hrs VS reviewed since prior progress note.  Most recent are:  Visit Vitals  /73 (BP 1 Location: Right arm, BP Patient Position: At rest)   Pulse 84   Temp 98.4 °F (36.9 °C)   Resp 16   Ht 6' (1.829 m)   Wt 97.8 kg (215 lb 9.6 oz)   SpO2 93%   BMI 29.24 kg/m² Intake/Output Summary (Last 24 hours) at 12/5/2020 1416  Last data filed at 12/4/2020 2240  Gross per 24 hour   Intake 1240 ml   Output 450 ml   Net 790 ml        Physical Examination:     I had a face to face encounter with this patient and independently examined them on 12/5/2020 as outlined below:          Constitutional:  No acute distress       Resp:  no wheezing,no accessory muscle use,no conversational tachypnea   CV:  Regular rhythm, normal rate    GI:  Soft, non distended, non tender, normal bowel sounds. Musculoskeletal:  No LE edema    Neurologic:  Moves all extremities. AAOx3     Psych:  Not anxious nor agitated. Skin:   no rashes or ulcers       Data Review:    Review and/or order of clinical lab test  Review and/or order of tests in the radiology section of CPT  Review and/or order of tests in the medicine section of CPT      Labs:     Recent Labs     12/05/20 0251 12/04/20 0108   WBC 13.1* 13.2*   HGB 14.2 14.8   HCT 42.7 43.2   * 396     Recent Labs     12/05/20 0251 12/04/20  0108    138   K 3.9 4.0    106   CO2 23 24   BUN 19 17   CREA 0.82 0.91   * 115*   CA 9.0 9.0   MG 2.5* 2.5*   PHOS 4.1 3.7     Recent Labs     12/05/20 0251 12/04/20 0108 12/03/20  0245   ALT 58 66 40   AP 79 99 80   TBILI 0.4 0.5 0.5   TP 6.9 7.6 6.8   ALB 2.7* 2.9* 2.6*   GLOB 4.2* 4.7* 4.2*     No results for input(s): INR, PTP, APTT, INREXT, INREXT in the last 72 hours. No results for input(s): FE, TIBC, PSAT, FERR in the last 72 hours. No results found for: FOL, RBCF   No results for input(s): PH, PCO2, PO2 in the last 72 hours. No results for input(s): CPK, CKNDX, TROIQ in the last 72 hours.     No lab exists for component: CPKMB  No results found for: CHOL, CHOLX, CHLST, CHOLV, HDL, HDLP, LDL, LDLC, DLDLP, TGLX, TRIGL, TRIGP, CHHD, CHHDX  No results found for: GLUCPOC  No results found for: COLOR, APPRN, SPGRU, REFSG, ALEX, PROTU, GLUCU, KETU, BILU, UROU, ABDOUL, LEUKU, GLUKE, EPSU, BACTU, WBCU, RBCU, CASTS, UCRY      Medications Reviewed:     Current Facility-Administered Medications   Medication Dose Route Frequency    ondansetron (ZOFRAN) injection 4 mg  4 mg IntraVENous Q8H PRN    prochlorperazine (COMPAZINE) with saline injection 5 mg  5 mg IntraVENous Q6H PRN    guaiFENesin-dextromethorphan (ROBITUSSIN DM) 100-10 mg/5 mL syrup 5 mL  5 mL Oral Q6H PRN    sodium chloride (NS) flush 5-40 mL  5-40 mL IntraVENous Q8H    sodium chloride (NS) flush 5-40 mL  5-40 mL IntraVENous PRN    acetaminophen (TYLENOL) tablet 650 mg  650 mg Oral Q6H PRN    Or    acetaminophen (TYLENOL) suppository 650 mg  650 mg Rectal Q6H PRN    zinc sulfate (ZINCATE) 220 (50) mg capsule 1 Cap  1 Cap Oral DAILY    ascorbic acid (vitamin C) (VITAMIN C) tablet 500 mg  500 mg Oral DAILY    dexAMETHasone (DECADRON) tablet 6 mg  6 mg Oral DAILY    enoxaparin (LOVENOX) injection 40 mg  40 mg SubCUTAneous Q12H    remdesivir 100 mg in 0.9% sodium chloride 250 mL IVPB  100 mg IntraVENous Q24H     ______________________________________________________________________  EXPECTED LENGTH OF STAY: 5d 12h  ACTUAL LENGTH OF STAY:          2                 Tasha Coello MD

## 2020-12-06 LAB
ALBUMIN SERPL-MCNC: 2.8 G/DL (ref 3.5–5)
ALBUMIN/GLOB SERPL: 0.6 {RATIO} (ref 1.1–2.2)
ALP SERPL-CCNC: 85 U/L (ref 45–117)
ALT SERPL-CCNC: 81 U/L (ref 12–78)
AST SERPL-CCNC: 41 U/L (ref 15–37)
BASOPHILS # BLD: 0.1 K/UL (ref 0–0.1)
BASOPHILS NFR BLD: 1 % (ref 0–1)
BILIRUB DIRECT SERPL-MCNC: <0.1 MG/DL (ref 0–0.2)
BILIRUB SERPL-MCNC: 0.4 MG/DL (ref 0.2–1)
D DIMER PPP FEU-MCNC: 0.33 MG/L FEU (ref 0–0.65)
DIFFERENTIAL METHOD BLD: ABNORMAL
EOSINOPHIL # BLD: 0.1 K/UL (ref 0–0.4)
EOSINOPHIL NFR BLD: 1 % (ref 0–7)
ERYTHROCYTE [DISTWIDTH] IN BLOOD BY AUTOMATED COUNT: 13 % (ref 11.5–14.5)
GLOBULIN SER CALC-MCNC: 4.4 G/DL (ref 2–4)
HCT VFR BLD AUTO: 44.5 % (ref 36.6–50.3)
HGB BLD-MCNC: 14.8 G/DL (ref 12.1–17)
IMM GRANULOCYTES # BLD AUTO: 0 K/UL
IMM GRANULOCYTES NFR BLD AUTO: 0 %
LYMPHOCYTES # BLD: 2.3 K/UL (ref 0.8–3.5)
LYMPHOCYTES NFR BLD: 16 % (ref 12–49)
MAGNESIUM SERPL-MCNC: 2.4 MG/DL (ref 1.6–2.4)
MCH RBC QN AUTO: 27.4 PG (ref 26–34)
MCHC RBC AUTO-ENTMCNC: 33.3 G/DL (ref 30–36.5)
MCV RBC AUTO: 82.3 FL (ref 80–99)
MONOCYTES # BLD: 0.7 K/UL (ref 0–1)
MONOCYTES NFR BLD: 5 % (ref 5–13)
MYELOCYTES NFR BLD MANUAL: 2 %
NEUTS BAND NFR BLD MANUAL: 2 % (ref 0–6)
NEUTS SEG # BLD: 10.6 K/UL (ref 1.8–8)
NEUTS SEG NFR BLD: 73 % (ref 32–75)
NRBC # BLD: 0 K/UL (ref 0–0.01)
NRBC BLD-RTO: 0 PER 100 WBC
PHOSPHATE SERPL-MCNC: 4.4 MG/DL (ref 2.6–4.7)
PLATELET # BLD AUTO: 506 K/UL (ref 150–400)
PLATELET COMMENTS,PCOM: ABNORMAL
PMV BLD AUTO: 9.5 FL (ref 8.9–12.9)
PROT SERPL-MCNC: 7.2 G/DL (ref 6.4–8.2)
RBC # BLD AUTO: 5.41 M/UL (ref 4.1–5.7)
RBC MORPH BLD: ABNORMAL
WBC # BLD AUTO: 14.1 K/UL (ref 4.1–11.1)

## 2020-12-06 PROCEDURE — 85379 FIBRIN DEGRADATION QUANT: CPT

## 2020-12-06 PROCEDURE — 74011250636 HC RX REV CODE- 250/636: Performed by: HOSPITALIST

## 2020-12-06 PROCEDURE — 36415 COLL VENOUS BLD VENIPUNCTURE: CPT

## 2020-12-06 PROCEDURE — 85025 COMPLETE CBC W/AUTO DIFF WBC: CPT

## 2020-12-06 PROCEDURE — 65270000029 HC RM PRIVATE

## 2020-12-06 PROCEDURE — 74011000258 HC RX REV CODE- 258: Performed by: INTERNAL MEDICINE

## 2020-12-06 PROCEDURE — 84100 ASSAY OF PHOSPHORUS: CPT

## 2020-12-06 PROCEDURE — 80076 HEPATIC FUNCTION PANEL: CPT

## 2020-12-06 PROCEDURE — 83735 ASSAY OF MAGNESIUM: CPT

## 2020-12-06 PROCEDURE — 74011250637 HC RX REV CODE- 250/637: Performed by: FAMILY MEDICINE

## 2020-12-06 PROCEDURE — 74011000250 HC RX REV CODE- 250: Performed by: INTERNAL MEDICINE

## 2020-12-06 RX ADMIN — REMDESIVIR 100 MG: 5 INJECTION INTRAVENOUS at 19:08

## 2020-12-06 RX ADMIN — Medication 5 ML: at 07:06

## 2020-12-06 RX ADMIN — ZINC SULFATE 220 MG (50 MG) CAPSULE 1 CAPSULE: CAPSULE at 09:23

## 2020-12-06 RX ADMIN — ENOXAPARIN SODIUM 40 MG: 40 INJECTION SUBCUTANEOUS at 22:00

## 2020-12-06 RX ADMIN — OXYCODONE HYDROCHLORIDE AND ACETAMINOPHEN 500 MG: 500 TABLET ORAL at 09:23

## 2020-12-06 RX ADMIN — Medication 10 ML: at 22:00

## 2020-12-06 RX ADMIN — DEXAMETHASONE 6 MG: 4 TABLET ORAL at 09:23

## 2020-12-06 RX ADMIN — ENOXAPARIN SODIUM 40 MG: 40 INJECTION SUBCUTANEOUS at 09:23

## 2020-12-06 RX ADMIN — Medication 10 ML: at 19:08

## 2020-12-06 NOTE — PROGRESS NOTES
Bedside and Verbal shift change report given to Marcia Mishra (oncoming nurse) by Nupur Weber (offgoing nurse). Report included the following information SBAR, Kardex, MAR and Recent Results.

## 2020-12-06 NOTE — PROGRESS NOTES
Walked pt in room from window to door 3x, O2 dropped to 89/90% on room air but dropped to 87% while talking and walking. Pt does say he feels a little short of breath. Pt recovered to 92% when at rest. Assisted pt with incentive spirometer. Pt did between 800 -1000 5x, slight dry cough. Encouraged IS every 1-2 hours and to be walking in the room every couple hours. Pt agreed.  Will continue to monitor

## 2020-12-06 NOTE — PROGRESS NOTES
[address] [facility] Adult  Hospitalist Group                                                                                          Hospitalist Progress Note  Sherry Maldonado MD  Answering service: [phone] from in house phone        Date of Service:  2020  NAME:  Anant Menendez  :  2000  MRN:  [id number]      Admission Summary:   Anant Menendez is a previously healthy 23 y.o. male who presents with dyspnea, dry cough, subjective fever, fatigue, and myalgia for 1 week, and is being admitted for viral pneumonia. Interval history / Subjective:   Complaining of some LÓPEZ, and dizziness with walking. O2 saturation down to 87% on RA with ambulation. Nausea this am, but no vomiting. Assessment & Plan:     Sepsis (Temp, HR > 100, WBC + source) secondary to COVID 19 pneumonia  - O2 to keep saturation over 90%  -continue decadron,vitamin C and zinc.  -ID consulted for remdesivir, initiated 12/3, last dose will be   -trend inflammatory markers,   -Promote prone positioning as able, incentive spirometry   - Monitor CMP daily while on remdesivir     N/V - ? COVID vs. Side effect from remdesivir? ongoing  - Continue PRN zofran     Mild hyponatremia: resolved. Code status: full  DVT prophylaxis: lovenox    Care Plan discussed with:patient,nurse  Anticipated Disposition: home  Anticipated Discharge-Once stable off O2 . Hospital Problems  Never Reviewed          Codes Class Noted POA    Viral pneumonia ICD-10-CM: J12.9  ICD-9-CM: 480.9  2020 Unknown        Pneumonia due to COVID-19 virus ICD-10-CM: U07.1, J12.89  ICD-9-CM: 480.8  2020 Yes                Review of Systems:   As per HPI      Vital Signs:    Last 24hrs VS reviewed since prior progress note.  Most recent are:  Visit Vitals  /70 (BP 1 Location: Right arm, BP Patient Position: At rest)   Pulse 74   Temp 98.5 °F (36.9 °C)   Resp 16   Ht 6' (1.829 m)   Wt 97.8 kg (215 lb 9.6 oz)   SpO2 94%   BMI 29.24 kg/m²         Intake/Output Summary (Last 24 hours) at 12/6/2020 1428  Last data filed at 12/6/2020 1401  Gross per 24 hour   Intake 480 ml   Output 400 ml   Net 80 ml        Physical Examination:     I had a face to face encounter with this patient and independently examined them on 12/6/2020 as outlined below:          Constitutional:  No acute distress       Resp:  no wheezing,no accessory muscle use,no conversational tachypnea   CV:  Regular rhythm, normal rate    GI:  Soft, non distended, non tender, normal bowel sounds. Musculoskeletal:  No LE edema    Neurologic:  Moves all extremities. AAOx3     Psych:  Not anxious nor agitated. Skin:   no rashes or ulcers       Data Review:    Review and/or order of clinical lab test  Review and/or order of tests in the radiology section of CPT  Review and/or order of tests in the medicine section of CPT      Labs:     Recent Labs     12/06/20 0225 12/05/20 0251   WBC 14.1* 13.1*   HGB 14.8 14.2   HCT 44.5 42.7   * 447*     Recent Labs     12/06/20 0225 12/05/20 0251 12/04/20  0108   NA  --  139 138   K  --  3.9 4.0   CL  --  106 106   CO2  --  23 24   BUN  --  19 17   CREA  --  0.82 0.91   GLU  --  123* 115*   CA  --  9.0 9.0   MG 2.4 2.5* 2.5*   PHOS 4.4 4.1 3.7     Recent Labs     12/06/20 0225 12/05/20 0251 12/04/20  0108   ALT 81* 58 66   AP 85 79 99   TBILI 0.4 0.4 0.5   TP 7.2 6.9 7.6   ALB 2.8* 2.7* 2.9*   GLOB 4.4* 4.2* 4.7*     No results for input(s): INR, PTP, APTT, INREXT, INREXT in the last 72 hours. No results for input(s): FE, TIBC, PSAT, FERR in the last 72 hours. No results found for: FOL, RBCF   No results for input(s): PH, PCO2, PO2 in the last 72 hours. No results for input(s): CPK, CKNDX, TROIQ in the last 72 hours.     No lab exists for component: CPKMB  No results found for: CHOL, CHOLX, CHLST, CHOLV, HDL, HDLP, LDL, LDLC, DLDLP, TGLX, TRIGL, TRIGP, CHHD, CHHDX  No results found for: GLUCPOC  No results found for: COLOR, APPRN, SPGRU, REFSG, ALEX, PROTU, GLUCU, KETU, BILU, UROU, ABDOUL, LEUKU, GLUKE, EPSU, BACTU, WBCU, RBCU, CASTS, UCRY      Medications Reviewed:     Current Facility-Administered Medications   Medication Dose Route Frequency    ondansetron (ZOFRAN) injection 4 mg  4 mg IntraVENous Q8H PRN    prochlorperazine (COMPAZINE) with saline injection 5 mg  5 mg IntraVENous Q6H PRN    guaiFENesin-dextromethorphan (ROBITUSSIN DM) 100-10 mg/5 mL syrup 5 mL  5 mL Oral Q6H PRN    sodium chloride (NS) flush 5-40 mL  5-40 mL IntraVENous Q8H    sodium chloride (NS) flush 5-40 mL  5-40 mL IntraVENous PRN    acetaminophen (TYLENOL) tablet 650 mg  650 mg Oral Q6H PRN    Or    acetaminophen (TYLENOL) suppository 650 mg  650 mg Rectal Q6H PRN    zinc sulfate (ZINCATE) 220 (50) mg capsule 1 Cap  1 Cap Oral DAILY    ascorbic acid (vitamin C) (VITAMIN C) tablet 500 mg  500 mg Oral DAILY    dexAMETHasone (DECADRON) tablet 6 mg  6 mg Oral DAILY    enoxaparin (LOVENOX) injection 40 mg  40 mg SubCUTAneous Q12H    remdesivir 100 mg in 0.9% sodium chloride 250 mL IVPB  100 mg IntraVENous Q24H     ______________________________________________________________________  EXPECTED LENGTH OF STAY: 5d 12h  ACTUAL LENGTH OF STAY:          3                 Era Salas MD

## 2020-12-07 VITALS
RESPIRATION RATE: 24 BRPM | HEART RATE: 129 BPM | WEIGHT: 215.6 LBS | TEMPERATURE: 98.8 F | OXYGEN SATURATION: 94 % | DIASTOLIC BLOOD PRESSURE: 73 MMHG | SYSTOLIC BLOOD PRESSURE: 130 MMHG | BODY MASS INDEX: 29.2 KG/M2 | HEIGHT: 72 IN

## 2020-12-07 PROBLEM — A41.9 SEPSIS WITH ACUTE HYPOXIC RESPIRATORY FAILURE (HCC): Status: ACTIVE | Noted: 2020-12-07

## 2020-12-07 PROBLEM — R65.20 SEPSIS WITH ACUTE HYPOXIC RESPIRATORY FAILURE (HCC): Status: ACTIVE | Noted: 2020-12-07

## 2020-12-07 PROBLEM — J96.01 SEPSIS WITH ACUTE HYPOXIC RESPIRATORY FAILURE (HCC): Status: ACTIVE | Noted: 2020-12-07

## 2020-12-07 LAB
ALBUMIN SERPL-MCNC: 2.9 G/DL (ref 3.5–5)
ALBUMIN/GLOB SERPL: 0.7 {RATIO} (ref 1.1–2.2)
ALP SERPL-CCNC: 81 U/L (ref 45–117)
ALT SERPL-CCNC: 137 U/L (ref 12–78)
ANION GAP SERPL CALC-SCNC: 6 MMOL/L (ref 5–15)
AST SERPL-CCNC: 60 U/L (ref 15–37)
BASOPHILS # BLD: 0 K/UL (ref 0–0.1)
BASOPHILS NFR BLD: 0 % (ref 0–1)
BILIRUB DIRECT SERPL-MCNC: 0.2 MG/DL (ref 0–0.2)
BILIRUB SERPL-MCNC: 0.4 MG/DL (ref 0.2–1)
BUN SERPL-MCNC: 16 MG/DL (ref 6–20)
BUN/CREAT SERPL: 18 (ref 12–20)
CALCIUM SERPL-MCNC: 8.7 MG/DL (ref 8.5–10.1)
CHLORIDE SERPL-SCNC: 106 MMOL/L (ref 97–108)
CO2 SERPL-SCNC: 25 MMOL/L (ref 21–32)
CREAT SERPL-MCNC: 0.88 MG/DL (ref 0.7–1.3)
DIFFERENTIAL METHOD BLD: ABNORMAL
EOSINOPHIL # BLD: 0 K/UL (ref 0–0.4)
EOSINOPHIL NFR BLD: 0 % (ref 0–7)
ERYTHROCYTE [DISTWIDTH] IN BLOOD BY AUTOMATED COUNT: 12.6 % (ref 11.5–14.5)
GLOBULIN SER CALC-MCNC: 4.2 G/DL (ref 2–4)
GLUCOSE SERPL-MCNC: 98 MG/DL (ref 65–100)
HCT VFR BLD AUTO: 46.1 % (ref 36.6–50.3)
HGB BLD-MCNC: 15.4 G/DL (ref 12.1–17)
IMM GRANULOCYTES # BLD AUTO: 0.4 K/UL (ref 0–0.04)
IMM GRANULOCYTES NFR BLD AUTO: 3 % (ref 0–0.5)
LYMPHOCYTES # BLD: 1.6 K/UL (ref 0.8–3.5)
LYMPHOCYTES NFR BLD: 12 % (ref 12–49)
MAGNESIUM SERPL-MCNC: 2.4 MG/DL (ref 1.6–2.4)
MCH RBC QN AUTO: 27.7 PG (ref 26–34)
MCHC RBC AUTO-ENTMCNC: 33.4 G/DL (ref 30–36.5)
MCV RBC AUTO: 83.1 FL (ref 80–99)
MONOCYTES # BLD: 0.9 K/UL (ref 0–1)
MONOCYTES NFR BLD: 7 % (ref 5–13)
NEUTS SEG # BLD: 10.5 K/UL (ref 1.8–8)
NEUTS SEG NFR BLD: 78 % (ref 32–75)
NRBC # BLD: 0 K/UL (ref 0–0.01)
NRBC BLD-RTO: 0 PER 100 WBC
PHOSPHATE SERPL-MCNC: 4.3 MG/DL (ref 2.6–4.7)
PLATELET # BLD AUTO: 549 K/UL (ref 150–400)
PMV BLD AUTO: 9.4 FL (ref 8.9–12.9)
POTASSIUM SERPL-SCNC: 4.2 MMOL/L (ref 3.5–5.1)
PROT SERPL-MCNC: 7.1 G/DL (ref 6.4–8.2)
RBC # BLD AUTO: 5.55 M/UL (ref 4.1–5.7)
RBC MORPH BLD: ABNORMAL
SODIUM SERPL-SCNC: 137 MMOL/L (ref 136–145)
WBC # BLD AUTO: 13.4 K/UL (ref 4.1–11.1)

## 2020-12-07 PROCEDURE — 90686 IIV4 VACC NO PRSV 0.5 ML IM: CPT | Performed by: INTERNAL MEDICINE

## 2020-12-07 PROCEDURE — 80048 BASIC METABOLIC PNL TOTAL CA: CPT

## 2020-12-07 PROCEDURE — 74011250636 HC RX REV CODE- 250/636: Performed by: INTERNAL MEDICINE

## 2020-12-07 PROCEDURE — 85025 COMPLETE CBC W/AUTO DIFF WBC: CPT

## 2020-12-07 PROCEDURE — 36415 COLL VENOUS BLD VENIPUNCTURE: CPT

## 2020-12-07 PROCEDURE — 74011250637 HC RX REV CODE- 250/637: Performed by: FAMILY MEDICINE

## 2020-12-07 PROCEDURE — 80076 HEPATIC FUNCTION PANEL: CPT

## 2020-12-07 PROCEDURE — 83735 ASSAY OF MAGNESIUM: CPT

## 2020-12-07 PROCEDURE — 74011250636 HC RX REV CODE- 250/636: Performed by: HOSPITALIST

## 2020-12-07 PROCEDURE — 84100 ASSAY OF PHOSPHORUS: CPT

## 2020-12-07 PROCEDURE — 90471 IMMUNIZATION ADMIN: CPT

## 2020-12-07 RX ORDER — ONDANSETRON 4 MG/1
4 TABLET, FILM COATED ORAL
Qty: 10 TAB | Refills: 0 | Status: SHIPPED | OUTPATIENT
Start: 2020-12-07 | End: 2020-12-17

## 2020-12-07 RX ORDER — ZINC SULFATE 50(220)MG
220 CAPSULE ORAL DAILY
Qty: 4 CAP | Refills: 0 | Status: SHIPPED | OUTPATIENT
Start: 2020-12-08 | End: 2020-12-12

## 2020-12-07 RX ORDER — GUAIFENESIN/DEXTROMETHORPHAN 100-10MG/5
5 SYRUP ORAL
Qty: 1 BOTTLE | Refills: 0 | Status: SHIPPED | OUTPATIENT
Start: 2020-12-07 | End: 2020-12-17

## 2020-12-07 RX ORDER — DEXAMETHASONE 6 MG/1
6 TABLET ORAL
Qty: 4 TAB | Refills: 0 | Status: SHIPPED | OUTPATIENT
Start: 2020-12-08 | End: 2020-12-12

## 2020-12-07 RX ORDER — ASCORBIC ACID 500 MG
500 TABLET ORAL DAILY
Qty: 30 TAB | Refills: 0 | Status: SHIPPED | OUTPATIENT
Start: 2020-12-08 | End: 2021-01-07

## 2020-12-07 RX ADMIN — ENOXAPARIN SODIUM 40 MG: 40 INJECTION SUBCUTANEOUS at 10:12

## 2020-12-07 RX ADMIN — DEXAMETHASONE 6 MG: 4 TABLET ORAL at 10:12

## 2020-12-07 RX ADMIN — ZINC SULFATE 220 MG (50 MG) CAPSULE 1 CAPSULE: CAPSULE at 10:12

## 2020-12-07 RX ADMIN — Medication 10 ML: at 07:19

## 2020-12-07 RX ADMIN — OXYCODONE HYDROCHLORIDE AND ACETAMINOPHEN 500 MG: 500 TABLET ORAL at 09:00

## 2020-12-07 RX ADMIN — INFLUENZA VIRUS VACCINE 0.5 ML: 15; 15; 15; 15 SUSPENSION INTRAMUSCULAR at 14:57

## 2020-12-07 NOTE — PROGRESS NOTES
Ambulated with patient in room, on room air standing he is 94%, while walking he desats to 90-91%, laying back down after he was at 89-90%, encouraged deep breathing for recovery. He is now at 92% on room air. He does have some dyspnea on exertion but overall states he feels better.

## 2020-12-07 NOTE — PROGRESS NOTES
Bedside shift change report given to [address] (oncoming nurse) by Tanesha Ellis (offgoing nurse). Report included the following information SBAR, Kardex, MAR and Recent Results.

## 2020-12-07 NOTE — ROUTINE PROCESS
[facility] follow-up new PCP transitional care appointment has been scheduled with [facility] for Dec 10 @ 1PM.  Pending patient discharge. Manju Peoples, Care Management Specialist.  Please provide completed application, photo ID, two months proof of income, listing of all medications and discharge papers. The  will call the pt first @ 1PM to do his intake. The doctor will call the patient after this is completed.

## 2020-12-07 NOTE — PROGRESS NOTES
Patient resting in bed, ambulated in room today. No complaints today. Problem: Risk for Spread of Infection  Goal: Prevent transmission of infectious organism to others  Description: Prevent the transmission of infectious organisms to other patients, staff members, and visitors. Outcome: Progressing Towards Goal     Problem: Patient Education:  Go to Education Activity  Goal: Patient/Family Education  Outcome: Progressing Towards Goal     Problem: Airway Clearance - Ineffective  Goal: Achieve or maintain patent airway  Outcome: Progressing Towards Goal     Problem: Gas Exchange - Impaired  Goal: Absence of hypoxia  Outcome: Progressing Towards Goal  Goal: Promote optimal lung function  Outcome: Progressing Towards Goal     Problem: Breathing Pattern - Ineffective  Goal: Ability to achieve and maintain a regular respiratory rate  Outcome: Progressing Towards Goal     Problem:  Body Temperature -  Risk of, Imbalanced  Goal: Ability to maintain a body temperature within defined limits  Outcome: Progressing Towards Goal  Goal: Will regain or maintain usual level of consciousness  Outcome: Progressing Towards Goal  Goal: Complications related to the disease process, condition or treatment will be avoided or minimized  Outcome: Progressing Towards Goal     Problem: Isolation Precautions - Risk of Spread of Infection  Goal: Prevent transmission of infectious organism to others  Outcome: Progressing Towards Goal     Problem: Risk for Fluid Volume Deficit  Goal: Maintain normal heart rhythm  Outcome: Progressing Towards Goal  Goal: Maintain absence of muscle cramping  Outcome: Progressing Towards Goal  Goal: Maintain normal serum potassium, sodium, calcium, phosphorus, and pH  Outcome: Progressing Towards Goal     Problem: Nutrition Deficits  Goal: Optimize nutrtional status  Outcome: Progressing Towards Goal     Problem: Fatigue  Goal: Verbalize increase energy and improved vitality  Outcome: Progressing Towards Goal Problem: Loneliness or Risk for Loneliness  Goal: Demonstrate positive use of time alone when socialization is not possible  Outcome: Progressing Towards Goal     Problem: Patient Education: Go to Patient Education Activity  Goal: Patient/Family Education  Outcome: Progressing Towards Goal     Problem: Discharge Planning  Goal: *Discharge to safe environment  Outcome: Progressing Towards Goal

## 2020-12-07 NOTE — PROGRESS NOTES
12/07/20 1439   Vital Signs   Temp 98.8 °F (37.1 °C)   Temp Source Oral   Pulse (Heart Rate) (!) 129   Heart Rate Source Monitor   Resp Rate 24   O2 Sat (%) 94 %   Level of Consciousness Alert   /73   MAP (Calculated) 92   BP 1 Method Automatic   BP 1 Location Right arm   BP Patient Position Sitting   MEWS Score 4   Oxygen Therapy   O2 Device Room air   Patient was just exiting bathroom, on continuous pulse ox showing at 80 now.

## 2020-12-07 NOTE — PROGRESS NOTES
RUBINA:  1. RUR-9%  2. Returning home today. CM notified CM Specialist to schedule follow up appointment at [address] [place] [address]. There are no other discharge needs at this time.     Johnny Bray [facility]

## 2020-12-07 NOTE — PROGRESS NOTES
Patient for discharge today, to be transported with his sister. Reviewed appointments and prescriptions, Gave [place] Covid folder. No further questions. IV's removed. Belongings packed. Problem: Risk for Spread of Infection  Goal: Prevent transmission of infectious organism to others  Description: Prevent the transmission of infectious organisms to other patients, staff members, and visitors.   12/7/2020 1604 by Constance Foster RN  Outcome: Resolved/Met  12/7/2020 1150 by Constance Foster RN  Outcome: Progressing Towards Goal     Problem: Patient Education:  Go to Education Activity  Goal: Patient/Family Education  12/7/2020 2636 by Constance Foster RN  Outcome: Resolved/Met  12/7/2020 1150 by Constance Foster RN  Outcome: Progressing Towards Goal     Problem: Airway Clearance - Ineffective  Goal: Achieve or maintain patent airway  12/7/2020 1604 by Constance Foster RN  Outcome: Resolved/Met  12/7/2020 1150 by Constance Foster RN  Outcome: Progressing Towards Goal     Problem: Gas Exchange - Impaired  Goal: Absence of hypoxia  12/7/2020 1604 by Constance Foster RN  Outcome: Resolved/Met  12/7/2020 1150 by Constance Foster RN  Outcome: Progressing Towards Goal  Goal: Promote optimal lung function  12/7/2020 1604 by Constance Foster RN  Outcome: Resolved/Met  12/7/2020 1150 by Constance Foster RN  Outcome: Progressing Towards Goal     Problem: Gas Exchange - Impaired  Goal: Promote optimal lung function  12/7/2020 1604 by Constance Foster RN  Outcome: Resolved/Met  12/7/2020 1150 by Constance Foster RN  Outcome: Progressing Towards Goal     Problem: Patient Education:  Go to Education Activity  Goal: Patient/Family Education  12/7/2020 1604 by Constance Foster RN  Outcome: Resolved/Met  12/7/2020 1150 by Constance Foster RN  Outcome: Progressing Towards Goal     Problem: Airway Clearance - Ineffective  Goal: Achieve or maintain patent airway  12/7/2020 1604 by Constance Foster RN  Outcome: Resolved/Met  12/7/2020 1150 by John Carrillo RN  Outcome: Progressing Towards Goal     Problem: Gas Exchange - Impaired  Goal: Absence of hypoxia  12/7/2020 1604 by John Carrillo RN  Outcome: Resolved/Met  12/7/2020 1150 by John Carrillo RN  Outcome: Progressing Towards Goal     Problem: Gas Exchange - Impaired  Goal: Promote optimal lung function  12/7/2020 1604 by John Carrillo RN  Outcome: Resolved/Met  12/7/2020 1150 by John Carrillo RN  Outcome: Progressing Towards Goal     Problem: Breathing Pattern - Ineffective  Goal: Ability to achieve and maintain a regular respiratory rate  12/7/2020 1604 by John Carrillo RN  Outcome: Resolved/Met  12/7/2020 1150 by John Carrillo RN  Outcome: Progressing Towards Goal     Problem: Fatigue  Goal: Verbalize increase energy and improved vitality  12/7/2020 1604 by John Carrillo RN  Outcome: Resolved/Met  12/7/2020 1150 by John Carrillo RN  Outcome: Progressing Towards Goal

## 2020-12-07 NOTE — ROUTINE PROCESS
Attempted to call and schedule new patient appt with [facility], unable to reach office. Will attempt to call again soon.

## 2020-12-07 NOTE — ROUTINE PROCESS
I have reached out to CM regarding if the patient would like to set up a new patient appt with [facility].  Awaiting return email

## 2020-12-07 NOTE — DISCHARGE SUMMARY
Discharge Summary       PATIENT ID: Caralyn Severe  MRN: [id number]   YOB: 2000    DATE OF ADMISSION: 12/1/2020  8:05 PM    DATE OF DISCHARGE: 12/07/2020   PRIMARY CARE PROVIDER: None     DISCHARGING PROVIDER: Era Salas MD    To contact this individual call [phone] and ask the  to page. If unavailable ask to be transferred the Adult Hospitalist Department. CONSULTATIONS: IP CONSULT TO INFECTIOUS DISEASES    PROCEDURES/SURGERIES: * No surgery found *    ADMITTING DIAGNOSES & HOSPITAL COURSE:   Sepsis secondary to COVID 19  Acute hypoxic respiratory failure - Secondary to COVID19    Per H and P   Maksim GRIFFIN Evans is a previously healthy 20 y. o. male who presents with dyspnea, dry cough, subjective fever, fatigue, and myalgia for 1 week, and is being admitted for viral pneumonia.      Patient was subsequently diagnosed with COVID-19. He required oxygen to maintain saturations over 90%. His O2 requirements remained low, however he did qualify for IV remdesivir. It was initiated on 12/3, he was also started on Decadron, vitamin C, vitamin D and zinc.  He had slow improvement, and on 12/7 he was stable on room air. His oxygen saturations remained around 94% while at rest.  He ambulated, and would desaturate down into the low 90s, however would quickly recover with taking big deep breaths. Patient feels he is much improved since admission, and is ready for discharge home. DISCHARGE DIAGNOSES / PLAN:      Sepsis (Temp, HR > 100, WBC + source) secondary to COVID 19 pneumonia  Acute hypoxic resp failure - as evidenced by requiring O2 to keep saturation over 90%, now resolved.    - O2 to keep saturation over 90%, ambulating today on RA, maintaining saturations over 90%  -continue decadron (for total of 10 days, additional 4 days) ,vitamin C and zinc.  -ID consulted now completed remdesivir   -inflammatory markers downtrending.   -Promote prone positioning as able, incentive spirometry   - Monitor CMP daily while on remdesivir      N/V - ? COVID vs. Side effect from remdesivir? ongoing  - Continue PRN zofran      Mild hyponatremia: resolved. ADDITIONAL CARE RECOMMENDATIONS:   - Please take all medications as prescribed. Note changes as below. **Continue decadron for an additional 4 days  ** Continue vitamin C, and vitamin Zn   - Please make sure to follow up with your primary care physician within 1-2 weeks of discharge for hospital follow up. - Please make sure to continue to monitor for: worsening chest pain, worsening shortness of breath, and recurrence of high fevers and return to the Emergency Department with any of these symptoms:   - Please get up slowly from a seated or laying position, avoid falls. - Avoid tobacco, alcohol and other illicit drug use. - You should quarantine for an additional 10 days post discharge, and the remaining three days of quarantine have to be completely symptom free. -Advance Care Planning  People with COVID-19 may have no symptoms, mild symptoms, such as fever, cough, and shortness of breath or they may have more severe illness, developing severe and fatal pneumonia. As a result, Advance Care Planning with attention to naming a health care decision maker (someone you trust to make healthcare decisions for you if you could not speak for yourself) and sharing other health care preferences is important BEFORE a possible health crisis. Please contact your Primary Care Provider to discuss Advance Care Planning.      Preventing the Spread of Coronavirus Disease 2019 in Homes and Residential Communities  For the most recent information go to [employer]    Prevention steps for People with confirmed or suspected COVID-19 (including persons under investigation) who do not need to be hospitalized  and   People with confirmed COVID-19 who were hospitalized and determined to be medically stable to go home    Your healthcare provider and public health staff will evaluate whether you can be cared for at home. If it is determined that you do not need to be hospitalized and can be isolated at home, you will be monitored by staff from your local or state health department. You should follow the prevention steps below until a healthcare provider or local or state health department says you can return to your normal activities. Stay home except to get medical care  People who are mildly ill with COVID-19 are able to isolate at home during their illness. You should restrict activities outside your home, except for getting medical care. Do not go to work, school, or public areas. Avoid using public transportation, ride-sharing, or taxis. Separate yourself from other people and animals in your home  People: As much as possible, you should stay in a specific room and away from other people in your home. Also, you should use a separate bathroom, if available. Animals: You should restrict contact with pets and other animals while you are sick with COVID-19, just like you would around other people. Although there have not been reports of pets or other animals becoming sick with COVID-19, it is still recommended that people sick with COVID-19 limit contact with animals until more information is known about the virus. When possible, have another member of your household care for your animals while you are sick. If you are sick with COVID-19, avoid contact with your pet, including petting, snuggling, being kissed or licked, and sharing food. If you must care for your pet or be around animals while you are sick, wash your hands before and after you interact with pets and wear a facemask. Call ahead before visiting your doctor  If you have a medical appointment, call the healthcare provider and tell them that you have or may have COVID-19.  This will help the healthcare providers office take steps to keep other people from getting infected or exposed. Wear a facemask  You should wear a facemask when you are around other people (e.g., sharing a room or vehicle) or pets and before you enter a healthcare providers office. If you are not able to wear a facemask (for example, because it causes trouble breathing), then people who live with you should not stay in the same room with you, or they should wear a facemask if they enter your room. Cover your coughs and sneezes  Cover your mouth and nose with a tissue when you cough or sneeze. Throw used tissues in a lined trash can. Immediately wash your hands with soap and water for at least 20 seconds or, if soap and water are not available, clean your hands with an alcohol-based hand  that contains at least 60% alcohol. Clean your hands often  Wash your hands often with soap and water for at least 20 seconds, especially after blowing your nose, coughing, or sneezing; going to the bathroom; and before eating or preparing food. If soap and water are not readily available, use an alcohol-based hand  with at least 60% alcohol, covering all surfaces of your hands and rubbing them together until they feel dry. Soap and water are the best option if hands are visibly dirty. Avoid touching your eyes, nose, and mouth with unwashed hands. Avoid sharing personal household items  You should not share dishes, drinking glasses, cups, eating utensils, towels, or bedding with other people or pets in your home. After using these items, they should be washed thoroughly with soap and water. Clean all high-touch surfaces everyday  High touch surfaces include counters, tabletops, doorknobs, bathroom fixtures, toilets, phones, keyboards, tablets, and bedside tables. Also, clean any surfaces that may have blood, stool, or body fluids on them. Use a household cleaning spray or wipe, according to the label instructions.  Labels contain instructions for safe and effective use of the cleaning product including precautions you should take when applying the product, such as wearing gloves and making sure you have good ventilation during use of the product. Monitor your symptoms  Seek prompt medical attention if your illness is worsening (e.g., difficulty breathing). Before seeking care, call your healthcare provider and tell them that you have, or are being evaluated for, COVID-19. Put on a facemask before you enter the facility. These steps will help the healthcare providers office to keep other people in the office or waiting room from getting infected or exposed. Ask your healthcare provider to call the local or state health department. Persons who are placed under active monitoring or facilitated self-monitoring should follow instructions provided by their local health department or occupational health professionals, as appropriate. When working with your local health department check their available hours. If you have a medical emergency and need to call 911, notify the dispatch personnel that you have, or are being evaluated for COVID-19. If possible, put on a facemask before emergency medical services arrive. Discontinuing home isolation  Patients with confirmed COVID-19 should remain under home isolation precautions until the risk of secondary transmission to others is thought to be low. The decision to discontinue home isolation precautions should be made on a case-by-case basis, in consultation with healthcare providers and state and local health departments.           PENDING TEST RESULTS:   At the time of discharge the following test results are still pending: None    FOLLOW UP APPOINTMENTS:    Follow-up Information     Follow up With Specialties Details Why Contact Info    [address] EMR DEPT Pediatric Emergency Medicine Go to If symptoms worsen Karine  [phone]    Primary care provider                 DIET: Resume previous diet    ACTIVITY: Activity as tolerated    WOUND CARE: None    EQUIPMENT needed: None      DISCHARGE MEDICATIONS:  Current Discharge Medication List      START taking these medications    Details   ascorbic acid, vitamin C, (VITAMIN C) 500 mg tablet Take 1 Tab by mouth daily for 30 days. Qty: 30 Tab, Refills: 0      dexAMETHasone (DECADRON) 6 mg tablet Take 1 Tab by mouth Daily (before breakfast) for 4 days. Qty: 4 Tab, Refills: 0      guaiFENesin-dextromethorphan (ROBITUSSIN DM) 100-10 mg/5 mL syrup Take 5 mL by mouth every six (6) hours as needed for Cough or Congestion for up to 10 days. Qty: 1 Bottle, Refills: 0      zinc sulfate (ZINCATE) 220 (50) mg capsule Take 1 Cap by mouth daily for 4 days. Qty: 4 Cap, Refills: 0      ondansetron hcl (Zofran) 4 mg tablet Take 1 Tab by mouth every eight (8) hours as needed for Nausea or Vomiting for up to 10 days. Qty: 10 Tab, Refills: 0               NOTIFY YOUR PHYSICIAN FOR ANY OF THE FOLLOWING:   Fever over 101 degrees for 24 hours. Chest pain, shortness of breath, fever, chills, nausea, vomiting, diarrhea, change in mentation, falling, weakness, bleeding. Severe pain or pain not relieved by medications. Or, any other signs or symptoms that you may have questions about.     DISPOSITION:  X  Home With:   OT  PT  HH  RN       Long term SNF/Inpatient Rehab    Independent/assisted living    Hospice    Other:       PATIENT CONDITION AT DISCHARGE:     Functional status    Poor     Deconditioned    X Independent      Cognition   X  Lucid     Forgetful     Dementia      Catheters/lines (plus indication)    Connell     PICC     PEG    X None      Code status   X  Full code     DNR      PHYSICAL EXAMINATION AT DISCHARGE:  Visit Vitals  /70 (BP 1 Location: Right arm, BP Patient Position: At rest)   Pulse 84   Temp 98 °F (36.7 °C)   Resp 18   Ht 6' (1.829 m)   Wt 97.8 kg (215 lb 9.6 oz)   SpO2 96%   BMI 29.24 kg/m²     Gen: NAD, awake in bed  HEENT: NC/AT, sclera anicteric, PERRL, EOMI  CV: RRR no m/r/g, normal S1 and S2, no pedal edema   Resp: CTA b/l no increased work of breathing, no wheezing or rhonchi, speaking in full sentences, saturating 94% on RA at rest.   Abd: NT/ND, normal bowel sounds, no rebound or guarding  Ext: 2+ pulses, no edema  Skin: No rashes or lesions      CHRONIC MEDICAL DIAGNOSES:  Problem List as of 12/7/2020 Never Reviewed          Codes Class Noted - Resolved    Viral pneumonia ICD-10-CM: J12.9  ICD-9-CM: 480.9  12/2/2020 - Present        Pneumonia due to COVID-19 virus ICD-10-CM: U07.1, J12.89  ICD-9-CM: 480.8  12/1/2020 - Present              Greater than 30 minutes were spent with the patient on counseling and coordination of care    Signed:   Scout Salas MD  12/7/2020  1:52 PM

## 2020-12-08 ENCOUNTER — PATIENT OUTREACH (OUTPATIENT)
Dept: CASE MANAGEMENT | Age: 20
End: 2020-12-08

## 2020-12-08 NOTE — PROGRESS NOTES
Patient contacted regarding COVID-19 diagnosis. Discussed COVID-19 related testing which was available at this time. Test results were positive. Patient informed of results, if available? yes. Outreach made within 2 business days of discharge: Yes     Patient's primary language is Syriac. CTN used [facility] for interpretation. Care Transition Nurse/ Ambulatory Care Manager/ LPN Care Coordinator contacted the patient by telephone to perform post discharge assessment. Verified name and  with patient as identifiers. Provided introduction to self, and explanation of the CTN/ACM/LPN role, and reason for call due to risk factors for infection and/or exposure to COVID-19. Symptoms reviewed with patient who verbalized the following symptoms: no new symptoms and no worsening symptoms. Due to no new or worsening symptoms encounter was not routed to provider for escalation. Discussed follow-up appointments. If no appointment was previously scheduled, appointment scheduling offered: yes  [facility] follow up appointment(s): No future appointments. Non-[facility] follow up appointment(s): n/a      Advance Care Planning:   Does patient have an Advance Directive: currently not on file; patient declined education    Patient has following risk factors of: no known risk factors. CTN/ACM/LPN reviewed discharge instructions, medical action plan and red flags such as increased shortness of breath, increasing fever and signs of decompensation with patient who verbalized understanding. Discussed exposure protocols and quarantine with CDC Guidelines What to do if you are sick with coronavirus disease 2019.  Patient was given an opportunity for questions and concerns. The patient agrees to contact the [facility] exposure line [phone], [facility]  ([phone]) and PCP office for questions related to their healthcare. CTN/ACM provided contact information for future needs.     Reviewed and educated patient on any new and changed medications related to discharge diagnosis. Patient/family/caregiver given information for [employer] and agrees to enroll no      Plan for follow-up call in 5-7 days based on severity of symptoms and risk factors.

## 2022-03-18 PROBLEM — U07.1 PNEUMONIA DUE TO COVID-19 VIRUS: Status: ACTIVE | Noted: 2020-12-01

## 2022-03-18 PROBLEM — J12.82 PNEUMONIA DUE TO COVID-19 VIRUS: Status: ACTIVE | Noted: 2020-12-01

## 2022-03-19 PROBLEM — J12.9 VIRAL PNEUMONIA: Status: ACTIVE | Noted: 2020-12-02

## 2022-03-19 PROBLEM — R65.20 SEPSIS WITH ACUTE HYPOXIC RESPIRATORY FAILURE (HCC): Status: ACTIVE | Noted: 2020-12-07

## 2022-03-19 PROBLEM — A41.9 SEPSIS WITH ACUTE HYPOXIC RESPIRATORY FAILURE (HCC): Status: ACTIVE | Noted: 2020-12-07

## 2022-03-19 PROBLEM — J96.01 SEPSIS WITH ACUTE HYPOXIC RESPIRATORY FAILURE (HCC): Status: ACTIVE | Noted: 2020-12-07

## 2022-08-01 ENCOUNTER — HOSPITAL ENCOUNTER (OUTPATIENT)
Dept: GENERAL RADIOLOGY | Age: 22
Discharge: HOME | End: 2022-08-01
Payer: COMMERCIAL

## 2022-08-01 ENCOUNTER — TRANSCRIBE ORDER (OUTPATIENT)
Dept: REGISTRATION | Age: 22
End: 2022-08-01

## 2022-08-01 DIAGNOSIS — R10.9 PAIN IN THE ABDOMEN: ICD-10-CM

## 2022-08-01 DIAGNOSIS — R10.9 PAIN IN THE ABDOMEN: Primary | ICD-10-CM

## 2022-08-01 PROCEDURE — 74018 RADEX ABDOMEN 1 VIEW: CPT

## 2023-11-16 NOTE — PROGRESS NOTES
Problem: Risk for Spread of Infection  Goal: Prevent transmission of infectious organism to others  Description: Prevent the transmission of infectious organisms to other patients, staff members, and visitors.   Outcome: Progressing Towards Goal show

## 2024-07-09 ENCOUNTER — HOSPITAL ENCOUNTER (EMERGENCY)
Facility: HOSPITAL | Age: 24
Discharge: HOME | End: 2024-07-09
Attending: STUDENT IN AN ORGANIZED HEALTH CARE EDUCATION/TRAINING PROGRAM
Payer: COMMERCIAL

## 2024-07-09 ENCOUNTER — APPOINTMENT (OUTPATIENT)
Facility: HOSPITAL | Age: 24
End: 2024-07-09
Payer: COMMERCIAL

## 2024-07-09 VITALS
SYSTOLIC BLOOD PRESSURE: 137 MMHG | OXYGEN SATURATION: 100 % | BODY MASS INDEX: 32.28 KG/M2 | WEIGHT: 238.32 LBS | DIASTOLIC BLOOD PRESSURE: 82 MMHG | HEIGHT: 72 IN | HEART RATE: 97 BPM | TEMPERATURE: 98.7 F | RESPIRATION RATE: 20 BRPM

## 2024-07-09 DIAGNOSIS — R06.02 SHORTNESS OF BREATH: ICD-10-CM

## 2024-07-09 DIAGNOSIS — F41.1 ANXIETY STATE: Primary | ICD-10-CM

## 2024-07-09 LAB
ALBUMIN SERPL-MCNC: 3.8 G/DL (ref 3.5–5)
ALBUMIN/GLOB SERPL: 1 (ref 1.1–2.2)
ALP SERPL-CCNC: 106 U/L (ref 45–117)
ALT SERPL-CCNC: 35 U/L (ref 12–78)
ANION GAP SERPL CALC-SCNC: 1 MMOL/L (ref 5–15)
AST SERPL-CCNC: 12 U/L (ref 15–37)
BASOPHILS # BLD: 0 K/UL (ref 0–0.1)
BASOPHILS NFR BLD: 1 % (ref 0–1)
BILIRUB SERPL-MCNC: 1.2 MG/DL (ref 0.2–1)
BUN SERPL-MCNC: 14 MG/DL (ref 6–20)
BUN/CREAT SERPL: 14 (ref 12–20)
CALCIUM SERPL-MCNC: 9.5 MG/DL (ref 8.5–10.1)
CHLORIDE SERPL-SCNC: 106 MMOL/L (ref 97–108)
CO2 SERPL-SCNC: 29 MMOL/L (ref 21–32)
CREAT SERPL-MCNC: 0.98 MG/DL (ref 0.7–1.3)
D DIMER PPP FEU-MCNC: <0.19 MG/L FEU (ref 0–0.65)
DIFFERENTIAL METHOD BLD: ABNORMAL
EKG ATRIAL RATE: 97 BPM
EKG DIAGNOSIS: NORMAL
EKG P AXIS: 37 DEGREES
EKG P-R INTERVAL: 158 MS
EKG Q-T INTERVAL: 338 MS
EKG QRS DURATION: 84 MS
EKG QTC CALCULATION (BAZETT): 429 MS
EKG R AXIS: 61 DEGREES
EKG T AXIS: -12 DEGREES
EKG VENTRICULAR RATE: 97 BPM
EOSINOPHIL # BLD: 0.1 K/UL (ref 0–0.4)
EOSINOPHIL NFR BLD: 1 % (ref 0–7)
ERYTHROCYTE [DISTWIDTH] IN BLOOD BY AUTOMATED COUNT: 12.4 % (ref 11.5–14.5)
FLUAV RNA SPEC QL NAA+PROBE: NOT DETECTED
FLUBV RNA SPEC QL NAA+PROBE: NOT DETECTED
GLOBULIN SER CALC-MCNC: 3.9 G/DL (ref 2–4)
GLUCOSE SERPL-MCNC: 119 MG/DL (ref 65–100)
HCT VFR BLD AUTO: 47.1 % (ref 36.6–50.3)
HGB BLD-MCNC: 15.8 G/DL (ref 12.1–17)
IMM GRANULOCYTES # BLD AUTO: 0 K/UL (ref 0–0.04)
IMM GRANULOCYTES NFR BLD AUTO: 0 % (ref 0–0.5)
LYMPHOCYTES # BLD: 1.6 K/UL (ref 0.8–3.5)
LYMPHOCYTES NFR BLD: 21 % (ref 12–49)
MCH RBC QN AUTO: 27.6 PG (ref 26–34)
MCHC RBC AUTO-ENTMCNC: 33.5 G/DL (ref 30–36.5)
MCV RBC AUTO: 82.3 FL (ref 80–99)
MONOCYTES # BLD: 0.5 K/UL (ref 0–1)
MONOCYTES NFR BLD: 6 % (ref 5–13)
NEUTS SEG # BLD: 5.6 K/UL (ref 1.8–8)
NEUTS SEG NFR BLD: 71 % (ref 32–75)
NRBC # BLD: 0 K/UL (ref 0–0.01)
NRBC BLD-RTO: 0 PER 100 WBC
PLATELET # BLD AUTO: 284 K/UL (ref 150–400)
PMV BLD AUTO: 10.3 FL (ref 8.9–12.9)
POTASSIUM SERPL-SCNC: 3.6 MMOL/L (ref 3.5–5.1)
PROT SERPL-MCNC: 7.7 G/DL (ref 6.4–8.2)
RBC # BLD AUTO: 5.72 M/UL (ref 4.1–5.7)
SARS-COV-2 RNA RESP QL NAA+PROBE: NOT DETECTED
SODIUM SERPL-SCNC: 136 MMOL/L (ref 136–145)
TROPONIN I SERPL HS-MCNC: <4 NG/L (ref 0–76)
WBC # BLD AUTO: 7.8 K/UL (ref 4.1–11.1)

## 2024-07-09 PROCEDURE — 93010 ELECTROCARDIOGRAM REPORT: CPT | Performed by: SPECIALIST

## 2024-07-09 PROCEDURE — 85379 FIBRIN DEGRADATION QUANT: CPT

## 2024-07-09 PROCEDURE — 71046 X-RAY EXAM CHEST 2 VIEWS: CPT

## 2024-07-09 PROCEDURE — 85025 COMPLETE CBC W/AUTO DIFF WBC: CPT

## 2024-07-09 PROCEDURE — 80053 COMPREHEN METABOLIC PANEL: CPT

## 2024-07-09 PROCEDURE — 36415 COLL VENOUS BLD VENIPUNCTURE: CPT

## 2024-07-09 PROCEDURE — 84484 ASSAY OF TROPONIN QUANT: CPT

## 2024-07-09 PROCEDURE — 99285 EMERGENCY DEPT VISIT HI MDM: CPT

## 2024-07-09 PROCEDURE — 93005 ELECTROCARDIOGRAM TRACING: CPT | Performed by: PHYSICIAN ASSISTANT

## 2024-07-09 PROCEDURE — 87636 SARSCOV2 & INF A&B AMP PRB: CPT

## 2024-07-09 ASSESSMENT — PAIN - FUNCTIONAL ASSESSMENT: PAIN_FUNCTIONAL_ASSESSMENT: NONE - DENIES PAIN

## 2024-07-09 NOTE — DISCHARGE INSTR - COC
Continuity of Care Form    Patient Name: Jadiel Kaiser   :  2000  MRN:  [id number]    Admit date:  2024  Discharge date:  ***    Code Status Order: No Order   Advance Directives:     Admitting Physician:  No admitting provider for patient encounter.  PCP: Lyndsay Chavez PA-C    Discharging Nurse: ***  Discharging Hospital Unit/Room#: R36/R36  Discharging Unit Phone Number: ***    Emergency Contact:   Extended Emergency Contact Information  Primary Emergency Contact: Deepthi Lilly  Mobile Phone: [phone]  Relation: Other    Past Surgical History:  Past Surgical History:   Procedure Laterality Date    APPENDECTOMY      HEENT      adnoidectomy    ORTHOPEDIC SURGERY      ankle-ligaments       Immunization History:   Immunization History   Administered Date(s) Administered    Influenza, FLUARIX, FLULAVAL, FLUZONE (age 6 mo+) AND AFLURIA, (age 3 y+), PF, 0.5mL 2020       Active Problems:  Patient Active Problem List   Diagnosis Code    Pneumonia due to COVID-19 virus U07.1, J12.82    Sepsis with acute hypoxic respiratory failure (HCC) A41.9, R65.20, J96.01    Viral pneumonia J12.9       Isolation/Infection:   Isolation            No Isolation          Patient Infection Status       Infection Onset Added Last Indicated Last Indicated By Review Planned Expiration Resolved Resolved By    None active    Resolved    COVID-19 20 Conversion, Epic   12/15/20 Conversion, Epic                       Nurse Assessment:  Last Vital Signs: /82   Pulse 97   Temp 98.7 °F (37.1 °C) (Oral)   Resp 20   Ht 1.829 m (6')   Wt 108.1 kg (238 lb 5.1 oz)   SpO2 100%   BMI 32.32 kg/m²     Last documented pain score (0-10 scale):    Last Weight:   Wt Readings from Last 1 Encounters:   24 108.1 kg (238 lb 5.1 oz)     Mental Status:  {IP PT MENTAL STATUS:}    IV Access:  { CESAR IV ACCESS:601417633}    Nursing Mobility/ADLs:  Walking   {[facility] DME ADLs:316062184}  Transfer  {[facility] DME

## 2024-07-09 NOTE — DISCHARGE INSTRUCTIONS
Keep your appointment with cardiology.  Follow up with your PCP  Consider meditation or deep breathing  [email or web address]  This may be a beneficial site to check into for counseling if needed

## 2024-07-09 NOTE — ED TRIAGE NOTES
Patient is coming back from vacation and is having shortness of breath and dizziness yesterday. Patient was seen at [facility] last week due to rapid heart rate.

## 2024-07-09 NOTE — ED PROVIDER NOTES
[facility] EMERGENCY DEP  EMERGENCY DEPARTMENT ENCOUNTER      Pt Name: Jadiel Kaiser  MRN: [id number]  Birthdate 2000  Date of evaluation: 7/9/2024  Provider: Hannah Camejo PA-C    CHIEF COMPLAINT       Chief Complaint   Patient presents with    Shortness of Breath         HISTORY OF PRESENT ILLNESS   (Location/Symptom, Timing/Onset, Context/Setting, Quality, Duration, Modifying Factors, Severity)  Note limiting factors.   The patient is a 24-year-old male who presents emergency department with concerns for shortness of breath.  The patient states that he is an anxious person, and is not sure if this may be anxiety related or some other process.    Yesterday he had symptoms of intermittent dizziness and short of breath-started yesterday while not doing much of anything he has had this randomly previously while he was working out, or while he was just at home.  He works as a  in a shop, he states it is not particularly hot in there, he does have some stress at work.    He was seen at [facility] on Wed, 7/4,  due to feeling like his heart beating too fast but normal in the evaluation, and told that it could be panic attack.      He is also feeling short of breath, no pain, better when he is able to calm himself and if he moves short of breath is unchanged.  The patient states that he exercises, he lifts weights, sometimes this causes him to be short of breath, but not all the time.    No fever chills or cough,  no short of breath with exercise, appetite intact, no diaphoresis.    He has a history of hypertension, but he is not taking medication for this.  The patient states that he is focused on lifestyle changes, he is attempted to have some weight loss, and this has improved his blood pressure.    Father DM, HTN    HTN    The history is provided by the patient.         Review of External Medical Records:     Nursing Notes were reviewed.    REVIEW OF SYSTEMS    (2-9 systems for level 4, 10 or more for